# Patient Record
Sex: MALE | Race: WHITE | NOT HISPANIC OR LATINO | URBAN - METROPOLITAN AREA
[De-identification: names, ages, dates, MRNs, and addresses within clinical notes are randomized per-mention and may not be internally consistent; named-entity substitution may affect disease eponyms.]

---

## 2017-07-22 ENCOUNTER — OUTPATIENT (OUTPATIENT)
Dept: OUTPATIENT SERVICES | Facility: HOSPITAL | Age: 52
LOS: 1 days | Discharge: HOME | End: 2017-07-22

## 2017-07-22 DIAGNOSIS — D35.00 BENIGN NEOPLASM OF UNSPECIFIED ADRENAL GLAND: ICD-10-CM

## 2017-07-22 DIAGNOSIS — I10 ESSENTIAL (PRIMARY) HYPERTENSION: ICD-10-CM

## 2018-02-08 ENCOUNTER — EMERGENCY (EMERGENCY)
Facility: HOSPITAL | Age: 53
LOS: 0 days | Discharge: LEFT BEFORE DISCHARGE | End: 2018-02-08

## 2018-02-08 VITALS
TEMPERATURE: 97 F | HEART RATE: 74 BPM | OXYGEN SATURATION: 100 % | SYSTOLIC BLOOD PRESSURE: 151 MMHG | RESPIRATION RATE: 18 BRPM | DIASTOLIC BLOOD PRESSURE: 92 MMHG

## 2018-02-08 DIAGNOSIS — Z88.0 ALLERGY STATUS TO PENICILLIN: ICD-10-CM

## 2018-02-08 DIAGNOSIS — M54.6 PAIN IN THORACIC SPINE: ICD-10-CM

## 2018-02-08 DIAGNOSIS — R07.89 OTHER CHEST PAIN: ICD-10-CM

## 2018-02-08 LAB
ALBUMIN SERPL ELPH-MCNC: 4 G/DL — SIGNIFICANT CHANGE UP (ref 3–5.5)
ALP SERPL-CCNC: 47 U/L — SIGNIFICANT CHANGE UP (ref 30–115)
ALT FLD-CCNC: 31 U/L — SIGNIFICANT CHANGE UP (ref 0–41)
ANION GAP SERPL CALC-SCNC: 7 MMOL/L — SIGNIFICANT CHANGE UP (ref 7–14)
APTT BLD: 28.7 SEC — SIGNIFICANT CHANGE UP (ref 27–39.2)
AST SERPL-CCNC: 30 U/L — SIGNIFICANT CHANGE UP (ref 0–41)
BILIRUB SERPL-MCNC: 0.6 MG/DL — SIGNIFICANT CHANGE UP (ref 0.2–1.2)
BUN SERPL-MCNC: 12 MG/DL — SIGNIFICANT CHANGE UP (ref 10–20)
CALCIUM SERPL-MCNC: 10.4 MG/DL — HIGH (ref 8.5–10.1)
CHLORIDE SERPL-SCNC: 107 MMOL/L — SIGNIFICANT CHANGE UP (ref 98–110)
CK MB CFR SERPL CALC: 3.3 NG/ML — SIGNIFICANT CHANGE UP (ref 0.6–6.3)
CK SERPL-CCNC: 105 U/L — SIGNIFICANT CHANGE UP (ref 0–225)
CO2 SERPL-SCNC: 27 MMOL/L — SIGNIFICANT CHANGE UP (ref 17–32)
CREAT SERPL-MCNC: 1 MG/DL — SIGNIFICANT CHANGE UP (ref 0.7–1.5)
D DIMER BLD IA.RAPID-MCNC: 270 NG/ML DDU — HIGH (ref 0–230)
GLUCOSE SERPL-MCNC: 131 MG/DL — HIGH (ref 70–110)
HCT VFR BLD CALC: 48.4 % — SIGNIFICANT CHANGE UP (ref 42–52)
HGB BLD-MCNC: 16.5 G/DL — SIGNIFICANT CHANGE UP (ref 14–18)
INR BLD: 1.02 RATIO — SIGNIFICANT CHANGE UP (ref 0.65–1.3)
MCHC RBC-ENTMCNC: 29.9 PG — SIGNIFICANT CHANGE UP (ref 27–31)
MCHC RBC-ENTMCNC: 34.1 G/DL — SIGNIFICANT CHANGE UP (ref 32–37)
MCV RBC AUTO: 87.8 FL — SIGNIFICANT CHANGE UP (ref 80–94)
NRBC # BLD: 0 /100 WBCS — SIGNIFICANT CHANGE UP (ref 0–0)
PLATELET # BLD AUTO: 294 K/UL — SIGNIFICANT CHANGE UP (ref 130–400)
POTASSIUM SERPL-MCNC: 4.3 MMOL/L — SIGNIFICANT CHANGE UP (ref 3.5–5)
POTASSIUM SERPL-SCNC: 4.3 MMOL/L — SIGNIFICANT CHANGE UP (ref 3.5–5)
PROT SERPL-MCNC: 7.3 G/DL — SIGNIFICANT CHANGE UP (ref 6–8)
PROTHROM AB SERPL-ACNC: 11 SEC — SIGNIFICANT CHANGE UP (ref 9.95–12.87)
RBC # BLD: 5.51 M/UL — SIGNIFICANT CHANGE UP (ref 4.7–6.1)
RBC # FLD: 13.2 % — SIGNIFICANT CHANGE UP (ref 11.5–14.5)
SODIUM SERPL-SCNC: 141 MMOL/L — SIGNIFICANT CHANGE UP (ref 135–146)
TROPONIN I SERPL-MCNC: <0.02 NG/ML — SIGNIFICANT CHANGE UP (ref 0–0.05)
WBC # BLD: 10.05 K/UL — SIGNIFICANT CHANGE UP (ref 4.8–10.8)
WBC # FLD AUTO: 10.05 K/UL — SIGNIFICANT CHANGE UP (ref 4.8–10.8)

## 2018-02-08 RX ORDER — IBUPROFEN 200 MG
600 TABLET ORAL ONCE
Qty: 0 | Refills: 0 | Status: COMPLETED | OUTPATIENT
Start: 2018-02-08 | End: 2018-02-08

## 2018-02-08 RX ADMIN — Medication 600 MILLIGRAM(S): at 14:34

## 2018-02-08 NOTE — ED PROVIDER NOTE - OBJECTIVE STATEMENT
51 yo male with no significant pmh presents to the ED c/o upper back pain with radiation to chest x 2-3 days. Associated with sob. No trauma or injury to back. worse with inspiration. Not associated with exertion or movement. Denies fever, chills, cough, URI sxs, abdominal pain, N/V, UE/LE weakness or paresthesias. Nonsmoker. + family hx CAD (father with MI at 59) 51 yo male with no significant pmh presents to the ED c/o upper back pain with radiation to chest x 2-3 days. Associated with sob. No trauma or injury to back. Back/chest pain is constant, worse with inspiration. Not associated with exertion or movement. Denies fever, chills, cough, URI sxs, abdominal pain, N/V, UE/LE weakness or paresthesias. Nonsmoker. + family hx CAD (father with MI at 59)

## 2018-02-08 NOTE — ED PROVIDER NOTE - ATTENDING CONTRIBUTION TO CARE
Seen with pa agree with above, lungs- clear, abdomen- soft no tenderness to any region, neuro- non focal, s1s2 no gallops or murmurs, Ekg- no ischemic changes, enzymes negative, d- dimer slightly elevated  will get CT- chest to rule out PE

## 2018-02-08 NOTE — ED PROVIDER NOTE - NS ED ROS FT
Constitutional: no fever, chills or generalized weakness  Cardiovascular: back pain with radiation to chest, + SOB. no syncope , no palpitations, no peripheral edema  Respiratory: + SOB. no cough. no h/o asthma or COPD. nonsmoker  Gastrointestinal: no nausea, vomiting. no abdominal pain  Musculoskeletal: + upper back pain with radiation to chest. no trauma to back or chest  Integumentary: no rash or skin changes. no edema  Neurological: no UE/LE weakness or paresthesias

## 2018-02-08 NOTE — ED PROVIDER NOTE - PROGRESS NOTE DETAILS
The patient wishes to leave against medical advice.  I have discussed the risks, benefits and alternatives (including the possibility of worsening of disease, pain, permanent disability, and/or death) with the patient.  The patient voices understanding of these risks, benefits, and alternatives and still wishes to sign out against medical advice.  The patient is awake, alert, oriented  x 3 and has demonstrated capacity to refuse/direct care.  I have advised the patient that they can and should return immediately should they develop any worse/different/additional symptoms, or if they change their mind and want to continue their care. Patient will follow up with cardiologist Dr. Dunne.

## 2018-02-08 NOTE — ED PROVIDER NOTE - CONDUCTED A DETAILED DISCUSSION WITH PATIENT AND/OR GUARDIAN REGARDING, MDM
return to ED if symptoms worsen, persist or questions arise/lab results/radiology results lab results/radiology results/need for outpatient follow-up/return to ED if symptoms worsen, persist or questions arise

## 2018-03-06 ENCOUNTER — TRANSCRIPTION ENCOUNTER (OUTPATIENT)
Age: 53
End: 2018-03-06

## 2018-03-29 ENCOUNTER — OUTPATIENT (OUTPATIENT)
Dept: OUTPATIENT SERVICES | Facility: HOSPITAL | Age: 53
LOS: 1 days | Discharge: HOME | End: 2018-03-29

## 2018-03-29 DIAGNOSIS — E78.5 HYPERLIPIDEMIA, UNSPECIFIED: ICD-10-CM

## 2018-03-29 DIAGNOSIS — I10 ESSENTIAL (PRIMARY) HYPERTENSION: ICD-10-CM

## 2019-02-28 ENCOUNTER — OUTPATIENT (OUTPATIENT)
Dept: OUTPATIENT SERVICES | Facility: HOSPITAL | Age: 54
LOS: 1 days | Discharge: HOME | End: 2019-02-28

## 2019-02-28 DIAGNOSIS — Z01.82 ENCOUNTER FOR ALLERGY TESTING: ICD-10-CM

## 2019-02-28 DIAGNOSIS — R23.2 FLUSHING: ICD-10-CM

## 2019-05-17 ENCOUNTER — OUTPATIENT (OUTPATIENT)
Dept: OUTPATIENT SERVICES | Facility: HOSPITAL | Age: 54
LOS: 1 days | Discharge: HOME | End: 2019-05-17
Payer: MEDICARE

## 2019-05-17 DIAGNOSIS — R10.9 UNSPECIFIED ABDOMINAL PAIN: ICD-10-CM

## 2019-05-17 DIAGNOSIS — N50.812 LEFT TESTICULAR PAIN: ICD-10-CM

## 2019-05-17 PROCEDURE — 76700 US EXAM ABDOM COMPLETE: CPT | Mod: 26

## 2019-05-17 PROCEDURE — 76870 US EXAM SCROTUM: CPT | Mod: 26

## 2020-07-27 ENCOUNTER — OUTPATIENT (OUTPATIENT)
Dept: OUTPATIENT SERVICES | Facility: HOSPITAL | Age: 55
LOS: 1 days | Discharge: HOME | End: 2020-07-27
Payer: MEDICARE

## 2020-07-27 DIAGNOSIS — M54.2 CERVICALGIA: ICD-10-CM

## 2020-07-27 PROCEDURE — 72050 X-RAY EXAM NECK SPINE 4/5VWS: CPT | Mod: 26

## 2020-08-25 ENCOUNTER — OUTPATIENT (OUTPATIENT)
Dept: OUTPATIENT SERVICES | Facility: HOSPITAL | Age: 55
LOS: 1 days | Discharge: HOME | End: 2020-08-25
Payer: MEDICARE

## 2020-08-25 DIAGNOSIS — M62.838 OTHER MUSCLE SPASM: ICD-10-CM

## 2020-08-25 DIAGNOSIS — M79.644 PAIN IN RIGHT FINGER(S): ICD-10-CM

## 2020-08-25 PROCEDURE — 72141 MRI NECK SPINE W/O DYE: CPT | Mod: 26

## 2020-09-03 ENCOUNTER — APPOINTMENT (OUTPATIENT)
Dept: VASCULAR SURGERY | Facility: CLINIC | Age: 55
End: 2020-09-03
Payer: MEDICARE

## 2020-09-03 PROCEDURE — 93971 EXTREMITY STUDY: CPT

## 2020-09-06 ENCOUNTER — OUTPATIENT (OUTPATIENT)
Dept: OUTPATIENT SERVICES | Facility: HOSPITAL | Age: 55
LOS: 1 days | Discharge: HOME | End: 2020-09-06
Payer: MEDICARE

## 2020-09-06 DIAGNOSIS — M25.571 PAIN IN RIGHT ANKLE AND JOINTS OF RIGHT FOOT: ICD-10-CM

## 2020-09-06 PROCEDURE — 73721 MRI JNT OF LWR EXTRE W/O DYE: CPT | Mod: 26,RT

## 2020-09-17 ENCOUNTER — APPOINTMENT (OUTPATIENT)
Dept: PODIATRY | Facility: CLINIC | Age: 55
End: 2020-09-17
Payer: MEDICARE

## 2020-09-17 VITALS
HEART RATE: 65 BPM | TEMPERATURE: 97.7 F | SYSTOLIC BLOOD PRESSURE: 124 MMHG | WEIGHT: 215 LBS | HEIGHT: 72 IN | DIASTOLIC BLOOD PRESSURE: 72 MMHG | BODY MASS INDEX: 29.12 KG/M2

## 2020-09-17 DIAGNOSIS — M89.9 DISORDER OF BONE, UNSPECIFIED: ICD-10-CM

## 2020-09-17 DIAGNOSIS — Z78.9 OTHER SPECIFIED HEALTH STATUS: ICD-10-CM

## 2020-09-17 DIAGNOSIS — M94.9 DISORDER OF BONE, UNSPECIFIED: ICD-10-CM

## 2020-09-17 DIAGNOSIS — M25.571 PAIN IN RIGHT ANKLE AND JOINTS OF RIGHT FOOT: ICD-10-CM

## 2020-09-17 DIAGNOSIS — Z72.89 OTHER PROBLEMS RELATED TO LIFESTYLE: ICD-10-CM

## 2020-09-17 DIAGNOSIS — Z82.49 FAMILY HISTORY OF ISCHEMIC HEART DISEASE AND OTHER DISEASES OF THE CIRCULATORY SYSTEM: ICD-10-CM

## 2020-09-17 PROCEDURE — 99203 OFFICE O/P NEW LOW 30 MIN: CPT

## 2020-09-30 PROBLEM — Z78.9 CAFFEINE USE: Status: ACTIVE | Noted: 2020-09-30

## 2020-09-30 PROBLEM — Z78.9 NON-SMOKER: Status: ACTIVE | Noted: 2020-09-30

## 2020-09-30 PROBLEM — Z82.49 FAMILY HISTORY OF MYOCARDIAL INFARCTION: Status: ACTIVE | Noted: 2020-09-30

## 2020-09-30 PROBLEM — Z78.9 KNOWN HEALTH PROBLEMS: NONE: Status: RESOLVED | Noted: 2020-09-30 | Resolved: 2020-09-30

## 2020-09-30 PROBLEM — M25.571 ACUTE RIGHT ANKLE PAIN: Status: ACTIVE | Noted: 2020-09-30

## 2020-09-30 PROBLEM — Z72.89 ALCOHOL USE: Status: ACTIVE | Noted: 2020-09-30

## 2020-09-30 PROBLEM — M89.9 OSTEOCHONDRAL TALAR DOME LESION: Status: ACTIVE | Noted: 2020-09-30

## 2020-09-30 PROBLEM — Z78.9 DOES NOT USE ILLICIT DRUGS: Status: ACTIVE | Noted: 2020-09-30

## 2020-09-30 NOTE — ASSESSMENT
[FreeTextEntry1] : \par \par Patient examined, history and chart reviewed\par Discussed Radiologist findings with Patient in Detail \par Patient made aware of all conditions and is in agreement with follow up treatment plan \par \par \par \par Impression: \par \par 1. Chronic medial osteochondral lesion of talus with complete fluid plane between the fragment and parent bone suggestive of instability. \par 2. Flexor hallucis longus myotendinous tear just above ankle with tenosynovitis \par 3. Degenerated os trigonum, PTFL sprain and synovitis. Findings suggest posterior ankle impingement \par 4. ATFL and calcaneofibular ligament sprains \par \par \par Discussed that condition\par Rx cam boot\par will follow up in 6-8 weeks for follow up evaluation

## 2020-09-30 NOTE — HISTORY OF PRESENT ILLNESS
[Sneakers] : adal [FreeTextEntry1] : CONSUELO QUINTANILLA   presents on 09/17/2020  for a foot examination, patient was referred by PCP. Patient complains of  pain of right ankle.  Patient denies NVFC and denies SOB.\par \par Patient was sent for Xray and MRI of Right ankle by PCP \par \par Patient denies acute injury that he can remember \par \par \par

## 2020-09-30 NOTE — VITALS
[Aching] : aching [de-identified] : right ankle  [FreeTextEntry3] : RIght foot medial ankle  [FreeTextEntry1] : rest  [FreeTextEntry2] : Ambulating

## 2020-09-30 NOTE — PHYSICAL EXAM
[General Appearance - Alert] : alert [General Appearance - In No Acute Distress] : in no acute distress [Ankle Swelling (On Exam)] : present [Ankle Swelling On The Right] : mild [Varicose Veins Of Lower Extremities] : bilaterally [Delayed in the Right Toes] : capillary refills normal in right toes [Delayed in the Left Toes] : capillary refills normal in the left toes [2+] : left foot dorsalis pedis 2+ [Skin Color & Pigmentation] : normal skin color and pigmentation [Skin Turgor] : normal skin turgor [] : no rash [Skin Lesions] : no skin lesions [Foot Ulcer] : no foot ulcer [Skin Induration] : no skin induration [Sensation] : the sensory exam was normal to light touch and pinprick [No Focal Deficits] : no focal deficits [Deep Tendon Reflexes (DTR)] : deep tendon reflexes were 2+ and symmetric [Motor Exam] : the motor exam was normal [Oriented To Time, Place, And Person] : oriented to person, place, and time [Impaired Insight] : insight and judgment were intact [Affect] : the affect was normal

## 2020-11-17 ENCOUNTER — APPOINTMENT (OUTPATIENT)
Dept: PODIATRY | Facility: CLINIC | Age: 55
End: 2020-11-17

## 2021-01-26 ENCOUNTER — OUTPATIENT (OUTPATIENT)
Dept: OUTPATIENT SERVICES | Facility: HOSPITAL | Age: 56
LOS: 1 days | Discharge: HOME | End: 2021-01-26
Payer: MEDICARE

## 2021-01-26 DIAGNOSIS — M25.562 PAIN IN LEFT KNEE: ICD-10-CM

## 2021-01-26 PROCEDURE — 73562 X-RAY EXAM OF KNEE 3: CPT | Mod: 26,LT

## 2021-04-09 ENCOUNTER — OUTPATIENT (OUTPATIENT)
Dept: OUTPATIENT SERVICES | Facility: HOSPITAL | Age: 56
LOS: 1 days | Discharge: HOME | End: 2021-04-09
Payer: MEDICARE

## 2021-04-09 DIAGNOSIS — N20.0 CALCULUS OF KIDNEY: ICD-10-CM

## 2021-04-09 PROCEDURE — 76775 US EXAM ABDO BACK WALL LIM: CPT | Mod: 26

## 2021-05-07 ENCOUNTER — OUTPATIENT (OUTPATIENT)
Dept: OUTPATIENT SERVICES | Facility: HOSPITAL | Age: 56
LOS: 1 days | Discharge: HOME | End: 2021-05-07
Payer: MEDICARE

## 2021-05-07 DIAGNOSIS — N20.0 CALCULUS OF KIDNEY: ICD-10-CM

## 2021-05-07 PROCEDURE — 74177 CT ABD & PELVIS W/CONTRAST: CPT | Mod: 26,MH

## 2021-05-11 ENCOUNTER — OUTPATIENT (OUTPATIENT)
Dept: OUTPATIENT SERVICES | Facility: HOSPITAL | Age: 56
LOS: 1 days | Discharge: HOME | End: 2021-05-11
Payer: MEDICARE

## 2021-05-11 DIAGNOSIS — M54.5 LOW BACK PAIN: ICD-10-CM

## 2021-05-11 PROCEDURE — 72148 MRI LUMBAR SPINE W/O DYE: CPT | Mod: 26,MH

## 2022-03-10 ENCOUNTER — APPOINTMENT (OUTPATIENT)
Dept: CARDIOLOGY | Facility: CLINIC | Age: 57
End: 2022-03-10
Payer: MEDICARE

## 2022-03-10 VITALS
WEIGHT: 222.9 LBS | TEMPERATURE: 97.9 F | HEIGHT: 72 IN | SYSTOLIC BLOOD PRESSURE: 126 MMHG | DIASTOLIC BLOOD PRESSURE: 74 MMHG | HEART RATE: 53 BPM | BODY MASS INDEX: 30.19 KG/M2 | OXYGEN SATURATION: 96 %

## 2022-03-10 PROCEDURE — 93000 ELECTROCARDIOGRAM COMPLETE: CPT

## 2022-03-10 PROCEDURE — 99203 OFFICE O/P NEW LOW 30 MIN: CPT

## 2022-03-10 NOTE — HISTORY OF PRESENT ILLNESS
[FreeTextEntry1] : 56M with pre-DM, family hxo CAD, COVID infection 2021 (no hospitalization) here for evaluation of dyspnea on exertion and chest discomfort. \par \par Used to be able to workout on stair machine for 45-60 minutes, now gasping for air after 15 minutes. Also has retrosternal chest discomfort that comes and go, lasting for 5-10 minutes at a time and resolving spontaneously. Can occur at rest, with exercise, standing, etc. Occurs up to 3x/day. No history of CAD. He has had a normal stress test in the past. \par \par Diet:\par Grilled chicken, salad, rice, fish\par Red meat 1x per week\par Not drinking much water\par Veg, Bananas, apples, No cold cuts\par \par Retired \par , 2 adult sons living out of state\par Non smoker\par \par Fam Hx\par Father: MI,  at 60 yo 3 mos after CABG\par Mother: cor stents in 70s, HTN\par Sister: SLE \par \par COVID vaccine x2\par COVID infection 2021

## 2022-03-10 NOTE — PHYSICAL EXAM
[Well Developed] : well developed [Well Nourished] : well nourished [No Acute Distress] : no acute distress [No Carotid Bruit] : no carotid bruit [Normal S1, S2] : normal S1, S2 [No Murmur] : no murmur [No Rub] : no rub [No Gallop] : no gallop [Clear Lung Fields] : clear lung fields [Good Air Entry] : good air entry [No Respiratory Distress] : no respiratory distress  [Soft] : abdomen soft [Moves all extremities] : moves all extremities [No Focal Deficits] : no focal deficits [No ulcers] : no ulcers [No edema] : no edema [Present] : present bilaterally

## 2022-03-10 NOTE — ASSESSMENT
[FreeTextEntry1] : Assessment:\par #Dyspnea on exertion\par #Chest discomfort\par - Has risk factors for CAD, need to rule out ischemia\par #COVID infection 12/2021\par #Pre-DM\par - 1/2021 HgA1c 5.9\par #Dyslipidemia\par - 1/2021 , , HDL 64,  not on any cholesterol lowering medications\par #FMH of CAD\par #BMI 30\par \par Plan:\par - Check tte and exercise nuclear stress test\par - CMP, A1c, TSH, lipid profile\par - Agree with pulmonary evaluation for dyspnea\par - RTC in 3 months\par

## 2022-03-10 NOTE — REVIEW OF SYSTEMS
[SOB] : shortness of breath [Dyspnea on exertion] : dyspnea during exertion [Joint Pain] : joint pain [Fever] : no fever [Headache] : no headache [Chills] : no chills [Chest Discomfort] : no chest discomfort [Lower Ext Edema] : no extremity edema [Leg Claudication] : no intermittent leg claudication [Palpitations] : no palpitations [Orthopnea] : no orthopnea [PND] : no PND [Syncope] : no syncope [Cough] : no cough [Abdominal Pain] : no abdominal pain [Rash] : no rash [Dizziness] : no dizziness [Confusion] : no confusion was observed

## 2022-03-11 ENCOUNTER — APPOINTMENT (OUTPATIENT)
Dept: CARDIOLOGY | Facility: CLINIC | Age: 57
End: 2022-03-11
Payer: MEDICARE

## 2022-03-11 PROCEDURE — 93306 TTE W/DOPPLER COMPLETE: CPT

## 2022-03-17 ENCOUNTER — OUTPATIENT (OUTPATIENT)
Dept: OUTPATIENT SERVICES | Facility: HOSPITAL | Age: 57
LOS: 1 days | Discharge: HOME | End: 2022-03-17
Payer: MEDICARE

## 2022-03-17 ENCOUNTER — RESULT REVIEW (OUTPATIENT)
Age: 57
End: 2022-03-17

## 2022-03-17 DIAGNOSIS — R06.02 SHORTNESS OF BREATH: ICD-10-CM

## 2022-03-17 PROCEDURE — 78452 HT MUSCLE IMAGE SPECT MULT: CPT | Mod: 26,MH

## 2022-03-23 ENCOUNTER — NON-APPOINTMENT (OUTPATIENT)
Age: 57
End: 2022-03-23

## 2022-03-23 LAB
ALBUMIN SERPL ELPH-MCNC: 4.5 G/DL
ALP BLD-CCNC: 53 U/L
ALT SERPL-CCNC: 25 U/L
ANION GAP SERPL CALC-SCNC: 11 MMOL/L
AST SERPL-CCNC: 23 U/L
BILIRUB SERPL-MCNC: 0.5 MG/DL
BUN SERPL-MCNC: 12 MG/DL
CALCIUM SERPL-MCNC: 10.1 MG/DL
CHLORIDE SERPL-SCNC: 105 MMOL/L
CHOLEST SERPL-MCNC: 221 MG/DL
CO2 SERPL-SCNC: 26 MMOL/L
CREAT SERPL-MCNC: 1.2 MG/DL
EGFR: 70 ML/MIN/1.73M2
ESTIMATED AVERAGE GLUCOSE: 120 MG/DL
GLUCOSE SERPL-MCNC: 85 MG/DL
HBA1C MFR BLD HPLC: 5.8 %
HDLC SERPL-MCNC: 71 MG/DL
LDLC SERPL CALC-MCNC: 141 MG/DL
NONHDLC SERPL-MCNC: 150 MG/DL
POTASSIUM SERPL-SCNC: 5.1 MMOL/L
PROT SERPL-MCNC: 7.6 G/DL
SODIUM SERPL-SCNC: 142 MMOL/L
TRIGL SERPL-MCNC: 67 MG/DL
TSH SERPL-ACNC: 1.27 UIU/ML

## 2022-03-29 ENCOUNTER — NON-APPOINTMENT (OUTPATIENT)
Age: 57
End: 2022-03-29

## 2022-03-31 NOTE — ED PROVIDER NOTE - PHYSICAL EXAMINATION
[de-identified] : 79 yr old male c/o clots of sneezing this week and it hurts his back when he sneezes (pinched nerves in his back).\par no discolored mucous \par +left epistaxis with nose blowing\par using NS spray\par -hx allergy GENERAL:  well appearing, non-toxic male in no acute distress  SKIN: skin warm, pink and dry. MMM. no rash to chest or back   PULM: CTAB. Normal respiratory effort. No respiratory distress. No wheezes, stridor, rales or rhonchi. No retractions  CV: RRR, no M/R/G.   ABD: Soft, non-tender, non-distended. No rebound or guarding.   MSK: FROM of all extremities. + mild TTP to upper back and anterior chest wall. Distal pulses intact.  NEURO: A+Ox3, no sensory/motor deficits, CN II-XII intact.

## 2022-04-27 DIAGNOSIS — R07.89 OTHER CHEST PAIN: ICD-10-CM

## 2022-06-30 ENCOUNTER — APPOINTMENT (OUTPATIENT)
Dept: CARDIOLOGY | Facility: CLINIC | Age: 57
End: 2022-06-30

## 2022-07-06 ENCOUNTER — HOSPITAL ENCOUNTER (EMERGENCY)
Facility: HOSPITAL | Age: 57
Discharge: HOME | End: 2022-07-06
Attending: EMERGENCY MEDICINE
Payer: MEDICARE

## 2022-07-06 ENCOUNTER — APPOINTMENT (EMERGENCY)
Dept: RADIOLOGY | Facility: HOSPITAL | Age: 57
End: 2022-07-06
Payer: MEDICARE

## 2022-07-06 VITALS
DIASTOLIC BLOOD PRESSURE: 81 MMHG | TEMPERATURE: 97.6 F | WEIGHT: 219 LBS | SYSTOLIC BLOOD PRESSURE: 146 MMHG | HEART RATE: 66 BPM | RESPIRATION RATE: 16 BRPM

## 2022-07-06 DIAGNOSIS — S69.91XA HAND INJURY, RIGHT, INITIAL ENCOUNTER: Primary | ICD-10-CM

## 2022-07-06 PROCEDURE — 99284 EMERGENCY DEPT VISIT MOD MDM: CPT | Performed by: EMERGENCY MEDICINE

## 2022-07-06 PROCEDURE — 73130 X-RAY EXAM OF HAND: CPT

## 2022-07-06 PROCEDURE — 99283 EMERGENCY DEPT VISIT LOW MDM: CPT

## 2022-07-06 NOTE — DISCHARGE INSTRUCTIONS
Take motrin/tylenol as needed, wear brace for comfort and follow up with orthopedics if not improving

## 2022-07-06 NOTE — ED PROVIDER NOTES
History  Chief Complaint   Patient presents with    Hand Pain     Patient reports twisting open a bottle today when they heard a "crack" to their right hand below right thumb  Patient reports pain but displays ability to move x5 fingers  2+ radial pulses, cap refill less than 2 seconds  HPI  59 yo RHD M presents with hand injury  He was opening a bottle today when he heard a crack and now has pain in hand  He is able to move hand  No weakness or numbness  Pain is moderate, constant, worse with movement  None       History reviewed  No pertinent past medical history  History reviewed  No pertinent surgical history  History reviewed  No pertinent family history  I have reviewed and agree with the history as documented  E-Cigarette/Vaping    E-Cigarette Use Never User      E-Cigarette/Vaping Substances     Social History     Tobacco Use    Smoking status: Never Smoker    Smokeless tobacco: Never Used   Vaping Use    Vaping Use: Never used   Substance Use Topics    Drug use: Never       Review of Systems   Constitutional: Negative for chills and fever  HENT: Negative for dental problem and ear pain  Eyes: Negative for pain and redness  Respiratory: Negative for cough and shortness of breath  Cardiovascular: Negative for chest pain and palpitations  Gastrointestinal: Negative for abdominal pain and nausea  Endocrine: Negative for polydipsia and polyphagia  Genitourinary: Negative for dysuria and frequency  Musculoskeletal: Positive for arthralgias  Negative for joint swelling  Skin: Negative for color change and rash  Neurological: Negative for dizziness and headaches  Psychiatric/Behavioral: Negative for behavioral problems and confusion  All other systems reviewed and are negative  Physical Exam  Physical Exam  Vitals and nursing note reviewed  Constitutional:       General: He is not in acute distress  HENT:      Head: Normocephalic and atraumatic        Right Ear: External ear normal       Left Ear: External ear normal       Nose: Nose normal    Eyes:      General: No scleral icterus  Cardiovascular:      Rate and Rhythm: Normal rate  Pulmonary:      Effort: Pulmonary effort is normal  No respiratory distress  Abdominal:      General: There is no distension  Musculoskeletal:         General: No deformity  Normal range of motion  Comments: R hand TTP dorsal aspect, normal ROM, radial pulse 2+   Skin:     Findings: No rash  Neurological:      General: No focal deficit present  Mental Status: He is alert  Gait: Gait normal    Psychiatric:         Mood and Affect: Mood normal          Vital Signs  ED Triage Vitals [07/06/22 1625]   Temperature Pulse Respirations Blood Pressure SpO2   97 6 °F (36 4 °C) 66 16 146/81 --      Temp Source Heart Rate Source Patient Position - Orthostatic VS BP Location FiO2 (%)   Oral Monitor Sitting Left arm --      Pain Score       --           Vitals:    07/06/22 1625   BP: 146/81   Pulse: 66   Patient Position - Orthostatic VS: Sitting         Visual Acuity      ED Medications  Medications - No data to display    Diagnostic Studies  Results Reviewed     None                 XR hand 3+ views RIGHT    (Results Pending)              Procedures  Procedures         ED Course                               SBIRT 22yo+    Flowsheet Row Most Recent Value   SBIRT (23 yo +)    In order to provide better care to our patients, we are screening all of our patients for alcohol and drug use  Would it be okay to ask you these screening questions? Yes Filed at: 07/06/2022 1710   Initial Alcohol Screen: US AUDIT-C     1  How often do you have a drink containing alcohol? 0 Filed at: 07/06/2022 1710   2  How many drinks containing alcohol do you have on a typical day you are drinking? 0 Filed at: 07/06/2022 1710   3a  Male UNDER 65: How often do you have five or more drinks on one occasion? 0 Filed at: 07/06/2022 1710   3b   FEMALE Any Age, or MALE 65+: How often do you have 4 or more drinks on one occassion? 0 Filed at: 07/06/2022 1710   Audit-C Score 0 Filed at: 07/06/2022 1710   AUNG: How many times in the past year have you    Used an illegal drug or used a prescription medication for non-medical reasons? Never Filed at: 07/06/2022 1710                    MDM  XR shows no acute fracture or dislocation per my read  Have placed velcro wrist splint, discussed follow up with orthopedics and supportive care  Disposition  Final diagnoses:   Hand injury, right, initial encounter     Time reflects when diagnosis was documented in both MDM as applicable and the Disposition within this note     Time User Action Codes Description Comment    7/6/2022  5:29 PM Rosalinda Habermann Add [[id number]] Hand injury, right, initial encounter       ED Disposition     ED Disposition   Discharge    Condition   Stable    Date/Time   Wed Jul 6, 2022  5:29 PM    Comment   Diane Lr discharge to home/self care  Follow-up Information     Follow up With Specialties Details Why Contact Info Additional [address] Orthopedic Surgery Call  for orthopedic follow up Ladarius Moncada 42 [phone] [address] [facility], [address], [place], [place], [phone]          Patient's Medications    No medications on file       No discharge procedures on file      PDMP Review     None          ED Provider  Electronically Signed by           Niurka Brooke MD  07/06/22 5402

## 2023-02-17 ENCOUNTER — NON-APPOINTMENT (OUTPATIENT)
Age: 58
End: 2023-02-17

## 2023-05-12 ENCOUNTER — EMERGENCY (EMERGENCY)
Facility: HOSPITAL | Age: 58
LOS: 0 days | Discharge: HOME | End: 2023-05-13
Attending: STUDENT IN AN ORGANIZED HEALTH CARE EDUCATION/TRAINING PROGRAM
Payer: MEDICARE

## 2023-05-12 VITALS
SYSTOLIC BLOOD PRESSURE: 135 MMHG | DIASTOLIC BLOOD PRESSURE: 79 MMHG | OXYGEN SATURATION: 97 % | HEIGHT: 72 IN | RESPIRATION RATE: 18 BRPM | WEIGHT: 218.04 LBS | HEART RATE: 69 BPM | TEMPERATURE: 99 F

## 2023-05-12 DIAGNOSIS — R10.31 RIGHT LOWER QUADRANT PAIN: ICD-10-CM

## 2023-05-12 DIAGNOSIS — Z88.0 ALLERGY STATUS TO PENICILLIN: ICD-10-CM

## 2023-05-12 DIAGNOSIS — R19.7 DIARRHEA, UNSPECIFIED: ICD-10-CM

## 2023-05-12 DIAGNOSIS — R63.0 ANOREXIA: ICD-10-CM

## 2023-05-12 LAB
ALBUMIN SERPL ELPH-MCNC: 4.1 G/DL — SIGNIFICANT CHANGE UP (ref 3.5–5.2)
ALP SERPL-CCNC: 50 U/L — SIGNIFICANT CHANGE UP (ref 30–115)
ALT FLD-CCNC: 38 U/L — SIGNIFICANT CHANGE UP (ref 0–41)
ANION GAP SERPL CALC-SCNC: 11 MMOL/L — SIGNIFICANT CHANGE UP (ref 7–14)
APPEARANCE UR: CLEAR — SIGNIFICANT CHANGE UP
AST SERPL-CCNC: 30 U/L — SIGNIFICANT CHANGE UP (ref 0–41)
BASOPHILS # BLD AUTO: 0.03 K/UL — SIGNIFICANT CHANGE UP (ref 0–0.2)
BASOPHILS NFR BLD AUTO: 0.4 % — SIGNIFICANT CHANGE UP (ref 0–1)
BILIRUB SERPL-MCNC: 0.4 MG/DL — SIGNIFICANT CHANGE UP (ref 0.2–1.2)
BILIRUB UR-MCNC: NEGATIVE — SIGNIFICANT CHANGE UP
BUN SERPL-MCNC: 11 MG/DL — SIGNIFICANT CHANGE UP (ref 10–20)
CALCIUM SERPL-MCNC: 9.3 MG/DL — SIGNIFICANT CHANGE UP (ref 8.4–10.5)
CHLORIDE SERPL-SCNC: 108 MMOL/L — SIGNIFICANT CHANGE UP (ref 98–110)
CO2 SERPL-SCNC: 26 MMOL/L — SIGNIFICANT CHANGE UP (ref 17–32)
COLOR SPEC: SIGNIFICANT CHANGE UP
CREAT SERPL-MCNC: 1 MG/DL — SIGNIFICANT CHANGE UP (ref 0.7–1.5)
DIFF PNL FLD: NEGATIVE — SIGNIFICANT CHANGE UP
EGFR: 87 ML/MIN/1.73M2 — SIGNIFICANT CHANGE UP
EOSINOPHIL # BLD AUTO: 0.34 K/UL — SIGNIFICANT CHANGE UP (ref 0–0.7)
EOSINOPHIL NFR BLD AUTO: 5 % — SIGNIFICANT CHANGE UP (ref 0–8)
GLUCOSE SERPL-MCNC: 88 MG/DL — SIGNIFICANT CHANGE UP (ref 70–99)
GLUCOSE UR QL: NEGATIVE — SIGNIFICANT CHANGE UP
HCT VFR BLD CALC: 46 % — SIGNIFICANT CHANGE UP (ref 42–52)
HGB BLD-MCNC: 15.3 G/DL — SIGNIFICANT CHANGE UP (ref 14–18)
IMM GRANULOCYTES NFR BLD AUTO: 0.3 % — SIGNIFICANT CHANGE UP (ref 0.1–0.3)
KETONES UR-MCNC: NEGATIVE — SIGNIFICANT CHANGE UP
LEUKOCYTE ESTERASE UR-ACNC: NEGATIVE — SIGNIFICANT CHANGE UP
LIDOCAIN IGE QN: 25 U/L — SIGNIFICANT CHANGE UP (ref 7–60)
LYMPHOCYTES # BLD AUTO: 2.56 K/UL — SIGNIFICANT CHANGE UP (ref 1.2–3.4)
LYMPHOCYTES # BLD AUTO: 37.5 % — SIGNIFICANT CHANGE UP (ref 20.5–51.1)
MCHC RBC-ENTMCNC: 30.3 PG — SIGNIFICANT CHANGE UP (ref 27–31)
MCHC RBC-ENTMCNC: 33.3 G/DL — SIGNIFICANT CHANGE UP (ref 32–37)
MCV RBC AUTO: 91.1 FL — SIGNIFICANT CHANGE UP (ref 80–94)
MONOCYTES # BLD AUTO: 0.75 K/UL — HIGH (ref 0.1–0.6)
MONOCYTES NFR BLD AUTO: 11 % — HIGH (ref 1.7–9.3)
NEUTROPHILS # BLD AUTO: 3.13 K/UL — SIGNIFICANT CHANGE UP (ref 1.4–6.5)
NEUTROPHILS NFR BLD AUTO: 45.8 % — SIGNIFICANT CHANGE UP (ref 42.2–75.2)
NITRITE UR-MCNC: NEGATIVE — SIGNIFICANT CHANGE UP
NRBC # BLD: 0 /100 WBCS — SIGNIFICANT CHANGE UP (ref 0–0)
PH UR: 6.5 — SIGNIFICANT CHANGE UP (ref 5–8)
PLATELET # BLD AUTO: 232 K/UL — SIGNIFICANT CHANGE UP (ref 130–400)
PMV BLD: 9.4 FL — SIGNIFICANT CHANGE UP (ref 7.4–10.4)
POTASSIUM SERPL-MCNC: 4.5 MMOL/L — SIGNIFICANT CHANGE UP (ref 3.5–5)
POTASSIUM SERPL-SCNC: 4.5 MMOL/L — SIGNIFICANT CHANGE UP (ref 3.5–5)
PROT SERPL-MCNC: 6.8 G/DL — SIGNIFICANT CHANGE UP (ref 6–8)
PROT UR-MCNC: ABNORMAL
RBC # BLD: 5.05 M/UL — SIGNIFICANT CHANGE UP (ref 4.7–6.1)
RBC # FLD: 13.1 % — SIGNIFICANT CHANGE UP (ref 11.5–14.5)
SODIUM SERPL-SCNC: 145 MMOL/L — SIGNIFICANT CHANGE UP (ref 135–146)
SP GR SPEC: >1.05 (ref 1.01–1.03)
UROBILINOGEN FLD QL: SIGNIFICANT CHANGE UP
WBC # BLD: 6.83 K/UL — SIGNIFICANT CHANGE UP (ref 4.8–10.8)
WBC # FLD AUTO: 6.83 K/UL — SIGNIFICANT CHANGE UP (ref 4.8–10.8)

## 2023-05-12 PROCEDURE — 96374 THER/PROPH/DIAG INJ IV PUSH: CPT

## 2023-05-12 PROCEDURE — 85025 COMPLETE CBC W/AUTO DIFF WBC: CPT

## 2023-05-12 PROCEDURE — 36415 COLL VENOUS BLD VENIPUNCTURE: CPT

## 2023-05-12 PROCEDURE — 74177 CT ABD & PELVIS W/CONTRAST: CPT | Mod: MA

## 2023-05-12 PROCEDURE — 87086 URINE CULTURE/COLONY COUNT: CPT

## 2023-05-12 PROCEDURE — 80053 COMPREHEN METABOLIC PANEL: CPT

## 2023-05-12 PROCEDURE — 76705 ECHO EXAM OF ABDOMEN: CPT

## 2023-05-12 PROCEDURE — 76705 ECHO EXAM OF ABDOMEN: CPT | Mod: 26

## 2023-05-12 PROCEDURE — 81001 URINALYSIS AUTO W/SCOPE: CPT

## 2023-05-12 PROCEDURE — 99284 EMERGENCY DEPT VISIT MOD MDM: CPT | Mod: 25

## 2023-05-12 PROCEDURE — 99284 EMERGENCY DEPT VISIT MOD MDM: CPT | Mod: FS

## 2023-05-12 PROCEDURE — 74177 CT ABD & PELVIS W/CONTRAST: CPT | Mod: 26,MA

## 2023-05-12 PROCEDURE — 83690 ASSAY OF LIPASE: CPT

## 2023-05-12 RX ORDER — KETOROLAC TROMETHAMINE 30 MG/ML
15 SYRINGE (ML) INJECTION ONCE
Refills: 0 | Status: DISCONTINUED | OUTPATIENT
Start: 2023-05-12 | End: 2023-05-12

## 2023-05-12 RX ORDER — METHOCARBAMOL 500 MG/1
1500 TABLET, FILM COATED ORAL ONCE
Refills: 0 | Status: COMPLETED | OUTPATIENT
Start: 2023-05-12 | End: 2023-05-12

## 2023-05-12 RX ADMIN — Medication 15 MILLIGRAM(S): at 22:56

## 2023-05-12 RX ADMIN — METHOCARBAMOL 1500 MILLIGRAM(S): 500 TABLET, FILM COATED ORAL at 22:56

## 2023-05-12 NOTE — ED ADULT NURSE NOTE - OBJECTIVE STATEMENT
Bad severe pain here, distended, no appetite, it hurts when I drink and eat. Its been at least 4 weeks, - patient   Wife reports patient got a colonoscopy and endoscopy today, had a CT scan at [facility] recently, no blood work done

## 2023-05-12 NOTE — ED PROVIDER NOTE - CLINICAL SUMMARY MEDICAL DECISION MAKING FREE TEXT BOX
pt was a signout from Dr. Webb. 57 yr old m that presents with abd pain. Labs  were ordered and reviewed.  Imaging was ordered and reviewed by me.  Appropriate medications for patient's presenting complaints were ordered and effects were reassessed.  Patient's records (prior hospital, ED visit, and/or nursing home notes if available) were reviewed.  Additional history was obtained from EMS, family, and/or PCP (where available).  Escalation to admission/observation was considered.  However patient feels much better and is comfortable with discharge.  Appropriate follow-up was arranged.     I have discussed the discharge plan with the patient. The patient agrees with the plan, as discussed.  The patient understands Emergency Department diagnosis is a preliminary diagnosis often based on limited information and that the patient must adhere to the follow-up plan as discussed.  The patient understands that if the symptoms worsen or if prescribed medications do not have the desired/planned effect that the patient may return to the Emergency Department at any time for further evaluation and treatment.

## 2023-05-12 NOTE — ED PROVIDER NOTE - PATIENT PORTAL LINK FT
You can access the FollowMyHealth Patient Portal offered by [facility] by registering at the following website: [email or web address]. By joining [employer] [facility]’s FollowMyHealth portal, you will also be able to view your health information using other applications (apps) compatible with our system.

## 2023-05-12 NOTE — ED ADULT NURSE NOTE - CHIEF COMPLAINT QUOTE
Bad severe pain here, distended, no appetite, it hurts when I drink and eat. Its been at least 4 weeks, - patient   Wife reports patient got a colonoscopy and endoscopy today, had a CT scan at [facility] recently, no blood work done

## 2023-05-12 NOTE — ED PROVIDER NOTE - ATTENDING APP SHARED VISIT CONTRIBUTION OF CARE
Patient no past medical hx presents with right lower quadrant pain for 1 month CT oral p.o. IV contrast negative performed outpatient.  Patient had a colonoscopy today with worsening pain for the last week intermittent comes in waves of note patient has been having decreased appetite intermittent diarrhea no blood.  Well appearing, NAD, non toxic. NCAT PERRLA EOMI neck supple non tender normal wob cta bl rrr abdomen s right lower quadrant tenderness along the rectus abdominis nd no rebound no guarding WWPx4 neuro non focal.  Rule out complication from GI procedure repeat CAT scan ultrasound labs urine

## 2023-05-12 NOTE — ED PROVIDER NOTE - PHYSICAL EXAMINATION
CONSTITUTIONAL: Well-appearing; well-nourished; in no apparent distress.   NECK: Supple; non-tender; no cervical lymphadenopathy.   CARDIOVASCULAR: Normal S1, S2; no murmurs, rubs, or gallops.   RESPIRATORY: Normal chest excursion with respiration; breath sounds clear and equal bilaterally; no wheezes, rhonchi, or rales.  GI/: right lower quadrant tenderness only along the rectus abdominis otherwise non-distended; non-tender; no palpable organomegaly.   MS: No evidence of trauma or deformity. Normal ROM in all four extremities; non-tender to palpation; distal pulses are normal.   SKIN: Normal for age and race; warm; dry; good turgor; no apparent lesions or exudate.   NEURO/PSYCH: A & O x 4; grossly unremarkable. mood and manner are appropriate.

## 2023-05-12 NOTE — ED PROVIDER NOTE - OBJECTIVE STATEMENT
pt presents to ED c/o pt presents to ED c/o RLQ pain for 1 month. has had eval with GI which includes CT with PO and IV contrast (neg) and a colonoscopy. pain is sharp, nonradiating, moderate. denies exacerbating or relieving factors. Denies fever/chill/HA/dizziness/chest pain/palpitation/sob/n/v/d/ black stool/bloody stool/urinary sxs

## 2023-05-12 NOTE — ED ADULT TRIAGE NOTE - CHIEF COMPLAINT QUOTE
Bad severe pain here, distended, no appetite, it hurts when I drink and eat. Its been at least 4 weeks, - patient   Wife reports patient got a colonoscopy and endoscopy today, had a CT scan at [facility] recently, no blood work done

## 2023-05-12 NOTE — ED ADULT NURSE NOTE - NSFALLUNIVINTERV_ED_ALL_ED
Bed/Stretcher in lowest position, wheels locked, appropriate side rails in place/Call bell, personal items and telephone in reach/Instruct patient to call for assistance before getting out of bed/chair/stretcher/Non-slip footwear applied when patient is off stretcher/[place] to call system/Physically safe environment - no spills, clutter or unnecessary equipment/Purposeful proactive rounding/Room/bathroom lighting operational, light cord in reach

## 2023-05-13 VITALS
RESPIRATION RATE: 18 BRPM | HEART RATE: 55 BPM | DIASTOLIC BLOOD PRESSURE: 84 MMHG | SYSTOLIC BLOOD PRESSURE: 127 MMHG | TEMPERATURE: 97 F | OXYGEN SATURATION: 96 %

## 2023-05-14 LAB
CULTURE RESULTS: NO GROWTH — SIGNIFICANT CHANGE UP
SPECIMEN SOURCE: SIGNIFICANT CHANGE UP

## 2023-09-14 ENCOUNTER — APPOINTMENT (EMERGENCY)
Dept: VASCULAR ULTRASOUND | Facility: HOSPITAL | Age: 58
End: 2023-09-14
Payer: MEDICARE

## 2023-09-14 ENCOUNTER — HOSPITAL ENCOUNTER (EMERGENCY)
Facility: HOSPITAL | Age: 58
Discharge: HOME | End: 2023-09-14
Attending: EMERGENCY MEDICINE
Payer: MEDICARE

## 2023-09-14 ENCOUNTER — APPOINTMENT (EMERGENCY)
Dept: RADIOLOGY | Facility: HOSPITAL | Age: 58
End: 2023-09-14
Payer: MEDICARE

## 2023-09-14 VITALS
TEMPERATURE: 98.5 F | DIASTOLIC BLOOD PRESSURE: 84 MMHG | RESPIRATION RATE: 18 BRPM | HEART RATE: 84 BPM | OXYGEN SATURATION: 98 % | SYSTOLIC BLOOD PRESSURE: 131 MMHG

## 2023-09-14 DIAGNOSIS — M25.571 RIGHT ANKLE PAIN: Primary | ICD-10-CM

## 2023-09-14 LAB
ALBUMIN SERPL BCP-MCNC: 4.3 G/DL (ref 3.5–5)
ALP SERPL-CCNC: 49 U/L (ref 34–104)
ALT SERPL W P-5'-P-CCNC: 19 U/L (ref 7–52)
ANION GAP SERPL CALCULATED.3IONS-SCNC: 9 MMOL/L
AST SERPL W P-5'-P-CCNC: 17 U/L (ref 13–39)
ATRIAL RATE: 76 BPM
BASOPHILS # BLD AUTO: 0.02 THOUSANDS/ÂΜL (ref 0–0.1)
BASOPHILS NFR BLD AUTO: 0 % (ref 0–1)
BILIRUB SERPL-MCNC: 0.87 MG/DL (ref 0.2–1)
BUN SERPL-MCNC: 9 MG/DL (ref 5–25)
CALCIUM SERPL-MCNC: 10 MG/DL (ref 8.4–10.2)
CHLORIDE SERPL-SCNC: 104 MMOL/L (ref 96–108)
CO2 SERPL-SCNC: 22 MMOL/L (ref 21–32)
CREAT SERPL-MCNC: 1 MG/DL (ref 0.6–1.3)
EOSINOPHIL # BLD AUTO: 0.07 THOUSAND/ÂΜL (ref 0–0.61)
EOSINOPHIL NFR BLD AUTO: 1 % (ref 0–6)
ERYTHROCYTE [DISTWIDTH] IN BLOOD BY AUTOMATED COUNT: 13.1 % (ref 11.6–15.1)
GFR SERPL CREATININE-BSD FRML MDRD: 82 ML/MIN/1.73SQ M
GLUCOSE SERPL-MCNC: 106 MG/DL (ref 65–140)
HCT VFR BLD AUTO: 48.7 % (ref 36.5–49.3)
HGB BLD-MCNC: 16.6 G/DL (ref 12–17)
IMM GRANULOCYTES # BLD AUTO: 0.02 THOUSAND/UL (ref 0–0.2)
IMM GRANULOCYTES NFR BLD AUTO: 0 % (ref 0–2)
LYMPHOCYTES # BLD AUTO: 2.94 THOUSANDS/ÂΜL (ref 0.6–4.47)
LYMPHOCYTES NFR BLD AUTO: 22 % (ref 14–44)
MCH RBC QN AUTO: 30.9 PG (ref 26.8–34.3)
MCHC RBC AUTO-ENTMCNC: 34.1 G/DL (ref 31.4–37.4)
MCV RBC AUTO: 91 FL (ref 82–98)
MONOCYTES # BLD AUTO: 1.1 THOUSAND/ÂΜL (ref 0.17–1.22)
MONOCYTES NFR BLD AUTO: 8 % (ref 4–12)
NEUTROPHILS # BLD AUTO: 9.07 THOUSANDS/ÂΜL (ref 1.85–7.62)
NEUTS SEG NFR BLD AUTO: 69 % (ref 43–75)
NRBC BLD AUTO-RTO: 0 /100 WBCS
P AXIS: 54 DEGREES
PLATELET # BLD AUTO: 254 THOUSANDS/UL (ref 149–390)
PMV BLD AUTO: 9.3 FL (ref 8.9–12.7)
POTASSIUM SERPL-SCNC: 4 MMOL/L (ref 3.5–5.3)
PR INTERVAL: 140 MS
PROT SERPL-MCNC: 8.1 G/DL (ref 6.4–8.4)
QRS AXIS: 36 DEGREES
QRSD INTERVAL: 80 MS
QT INTERVAL: 378 MS
QTC INTERVAL: 425 MS
RBC # BLD AUTO: 5.38 MILLION/UL (ref 3.88–5.62)
SODIUM SERPL-SCNC: 135 MMOL/L (ref 135–147)
T WAVE AXIS: 72 DEGREES
VENTRICULAR RATE: 76 BPM
WBC # BLD AUTO: 13.22 THOUSAND/UL (ref 4.31–10.16)

## 2023-09-14 PROCEDURE — 99284 EMERGENCY DEPT VISIT MOD MDM: CPT

## 2023-09-14 PROCEDURE — 96374 THER/PROPH/DIAG INJ IV PUSH: CPT

## 2023-09-14 PROCEDURE — 36415 COLL VENOUS BLD VENIPUNCTURE: CPT | Performed by: PHYSICIAN ASSISTANT

## 2023-09-14 PROCEDURE — 99284 EMERGENCY DEPT VISIT MOD MDM: CPT | Performed by: PHYSICIAN ASSISTANT

## 2023-09-14 PROCEDURE — 73610 X-RAY EXAM OF ANKLE: CPT

## 2023-09-14 PROCEDURE — 93971 EXTREMITY STUDY: CPT | Performed by: SURGERY

## 2023-09-14 PROCEDURE — 93010 ELECTROCARDIOGRAM REPORT: CPT | Performed by: INTERNAL MEDICINE

## 2023-09-14 PROCEDURE — 80053 COMPREHEN METABOLIC PANEL: CPT | Performed by: PHYSICIAN ASSISTANT

## 2023-09-14 PROCEDURE — 93005 ELECTROCARDIOGRAM TRACING: CPT

## 2023-09-14 PROCEDURE — 93971 EXTREMITY STUDY: CPT

## 2023-09-14 PROCEDURE — 85025 COMPLETE CBC W/AUTO DIFF WBC: CPT | Performed by: PHYSICIAN ASSISTANT

## 2023-09-14 RX ORDER — COLCHICINE 0.6 MG/1
0.6 TABLET ORAL DAILY
Qty: 7 TABLET | Refills: 0 | Status: SHIPPED | OUTPATIENT
Start: 2023-09-14 | End: 2023-09-21

## 2023-09-14 RX ORDER — ONDANSETRON 4 MG/1
4 TABLET, ORALLY DISINTEGRATING ORAL EVERY 8 HOURS PRN
Qty: 15 TABLET | Refills: 0 | Status: SHIPPED | OUTPATIENT
Start: 2023-09-14

## 2023-09-14 RX ORDER — PREDNISONE 20 MG/1
60 TABLET ORAL DAILY
Qty: 12 TABLET | Refills: 0 | Status: SHIPPED | OUTPATIENT
Start: 2023-09-14 | End: 2023-09-14 | Stop reason: SDUPTHER

## 2023-09-14 RX ORDER — KETOROLAC TROMETHAMINE 30 MG/ML
15 INJECTION, SOLUTION INTRAMUSCULAR; INTRAVENOUS ONCE
Status: COMPLETED | OUTPATIENT
Start: 2023-09-14 | End: 2023-09-14

## 2023-09-14 RX ORDER — PREDNISONE 20 MG/1
60 TABLET ORAL DAILY
Qty: 12 TABLET | Refills: 0 | Status: SHIPPED | OUTPATIENT
Start: 2023-09-14 | End: 2023-09-18

## 2023-09-14 RX ORDER — PREDNISONE 20 MG/1
60 TABLET ORAL ONCE
Status: COMPLETED | OUTPATIENT
Start: 2023-09-14 | End: 2023-09-14

## 2023-09-14 RX ADMIN — PREDNISONE 60 MG: 20 TABLET ORAL at 18:51

## 2023-09-14 RX ADMIN — KETOROLAC TROMETHAMINE 15 MG: 30 INJECTION, SOLUTION INTRAMUSCULAR; INTRAVENOUS at 17:43

## 2023-09-14 NOTE — ED PROVIDER NOTES
History  Chief Complaint   Patient presents with   • Leg Pain     Severe pain in R foot radiating into leg and up into hip, L calf spasms, headaches, nausea. Denies redness and swelling in calves     This is a 49-year-old male patient presenting for evaluation of right ankle pain. Started last night getting worse today. Having spasms in his right calf. Pain shooting up his leg into his buttock. Pain also shoots to his foot. Denies any fevers chills rash or redness. Does have swelling but not localized to the joint itself. He denies any recent travels traumas or surgeries. No injuries that he can recall. He has history of gout and states this seems more severe than his usual gout but otherwise the character/quality of this pain feels similar. History provided by:  Patient   used: No        None       History reviewed. No pertinent past medical history. History reviewed. No pertinent surgical history. History reviewed. No pertinent family history. I have reviewed and agree with the history as documented. E-Cigarette/Vaping   • E-Cigarette Use Never User      E-Cigarette/Vaping Substances     Social History     Tobacco Use   • Smoking status: Never   • Smokeless tobacco: Never   Vaping Use   • Vaping Use: Never used   Substance Use Topics   • Drug use: Never       Review of Systems   Constitutional: Negative for chills and fever. HENT: Negative for ear pain and sore throat. Eyes: Negative for pain and visual disturbance. Respiratory: Negative for cough and shortness of breath. Cardiovascular: Negative for chest pain and palpitations. Gastrointestinal: Negative for abdominal pain and vomiting. Genitourinary: Negative for dysuria and hematuria. Musculoskeletal: Positive for arthralgias. Negative for back pain. Skin: Negative for color change and rash. Neurological: Negative for seizures and syncope.    All other systems reviewed and are negative. Physical Exam  Physical Exam  Vitals and nursing note reviewed. Constitutional:       General: He is not in acute distress. Appearance: He is well-developed. HENT:      Head: Normocephalic and atraumatic. Eyes:      Conjunctiva/sclera: Conjunctivae normal.   Cardiovascular:      Rate and Rhythm: Normal rate and regular rhythm. Pulses:           Dorsalis pedis pulses are 2+ on the right side. Heart sounds: No murmur heard. Pulmonary:      Effort: Pulmonary effort is normal. No respiratory distress. Breath sounds: Normal breath sounds. Abdominal:      Palpations: Abdomen is soft. Tenderness: There is no abdominal tenderness. Musculoskeletal:         General: No swelling. Cervical back: Neck supple. Right ankle: Swelling present. Tenderness present over the lateral malleolus and medial malleolus. Decreased range of motion. Skin:     General: Skin is warm and dry. Capillary Refill: Capillary refill takes less than 2 seconds. Neurological:      Mental Status: He is alert.    Psychiatric:         Mood and Affect: Mood normal.         Vital Signs  ED Triage Vitals [09/14/23 1649]   Temperature Pulse Respirations Blood Pressure SpO2   98.5 °F (36.9 °C) 84 18 134/87 98 %      Temp Source Heart Rate Source Patient Position - Orthostatic VS BP Location FiO2 (%)   Temporal Monitor Sitting Left arm --      Pain Score       --           Vitals:    09/14/23 1649 09/14/23 1847   BP: 134/87 131/84   Pulse: 84    Patient Position - Orthostatic VS: Sitting Sitting         Visual Acuity      ED Medications  Medications   ketorolac (TORADOL) injection 15 mg (15 mg Intravenous Given 9/14/23 1743)   predniSONE tablet 60 mg (60 mg Oral Given 9/14/23 1851)       Diagnostic Studies  Results Reviewed     Procedure Component Value Units Date/Time    Comprehensive metabolic panel [[id number]] Collected: 09/14/23 1748    Lab Status: Final result Specimen: Blood Updated: 09/14/23 1807     Sodium 135 mmol/L      Potassium 4.0 mmol/L      Chloride 104 mmol/L      CO2 22 mmol/L      ANION GAP 9 mmol/L      BUN 9 mg/dL      Creatinine 1.00 mg/dL      Glucose 106 mg/dL      Calcium 10.0 mg/dL      AST 17 U/L      ALT 19 U/L      Alkaline Phosphatase 49 U/L      Total Protein 8.1 g/dL      Albumin 4.3 g/dL      Total Bilirubin 0.87 mg/dL      eGFR 82 ml/min/1.73sq m     Narrative:      National Kidney Disease Foundation guidelines for Chronic Kidney Disease (CKD):   •  Stage 1 with normal or high GFR (GFR > 90 mL/min/1.73 square meters)  •  Stage 2 Mild CKD (GFR = 60-89 mL/min/1.73 square meters)  •  Stage 3A Moderate CKD (GFR = 45-59 mL/min/1.73 square meters)  •  Stage 3B Moderate CKD (GFR = 30-44 mL/min/1.73 square meters)  •  Stage 4 Severe CKD (GFR = 15-29 mL/min/1.73 square meters)  •  Stage 5 End Stage CKD (GFR <15 mL/min/1.73 square meters)  Note: GFR calculation is accurate only with a steady state creatinine    CBC and differential [[id number]]  (Abnormal) Collected: 09/14/23 1749    Lab Status: Final result Specimen: Blood Updated: 09/14/23 1749     WBC 13.22 Thousand/uL      RBC 5.38 Million/uL      Hemoglobin 16.6 g/dL      Hematocrit 48.7 %      MCV 91 fL      MCH 30.9 pg      MCHC 34.1 g/dL      RDW 13.1 %      MPV 9.3 fL      Platelets 127 Thousands/uL      nRBC 0 /100 WBCs      Neutrophils Relative 69 %      Immat GRANS % 0 %      Lymphocytes Relative 22 %      Monocytes Relative 8 %      Eosinophils Relative 1 %      Basophils Relative 0 %      Neutrophils Absolute 9.07 Thousands/µL      Immature Grans Absolute 0.02 Thousand/uL      Lymphocytes Absolute 2.94 Thousands/µL      Monocytes Absolute 1.10 Thousand/µL      Eosinophils Absolute 0.07 Thousand/µL      Basophils Absolute 0.02 Thousands/µL                  XR ankle 3+ views RIGHT    (Results Pending)   VAS lower limb venous duplex study, unilateral/limited    (Results Pending)              Procedures  ECG 12 Lead Documentation Only    Date/Time: 9/14/2023 6:36 PM    Performed by: Juliane Bahena PA-C  Authorized by: Juliane Bahena PA-C    ECG reviewed by me, the ED Provider: yes    Patient location:  ED  Interpretation:     Interpretation: normal    Quality:     Tracing quality:  Limited by artifact  Rate:     ECG rate:  76    ECG rate assessment: normal    Rhythm:     Rhythm: sinus rhythm    Ectopy:     Ectopy: none    QRS:     QRS axis:  Normal    QRS intervals:  Normal  Conduction:     Conduction: normal    ST segments:     ST segments:  Normal  T waves:     T waves: normal               ED Course  ED Course as of 09/14/23 1947   Thu Sep 14, 2023   1757 VAS lower limb venous duplex study, unilateral/limited  negative                               SBIRT 20yo+    Flowsheet Row Most Recent Value   Initial Alcohol Screen: US AUDIT-C     1. How often do you have a drink containing alcohol? 1 Filed at: 09/14/2023 1651   2. How many drinks containing alcohol do you have on a typical day you are drinking? 0 Filed at: 09/14/2023 1651   3a. Male UNDER 65: How often do you have five or more drinks on one occasion? 0 Filed at: 09/14/2023 1651   3b. FEMALE Any Age, or MALE 65+: How often do you have 4 or more drinks on one occassion? 0 Filed at: 09/14/2023 1651   Audit-C Score 1 Filed at: 09/14/2023 1651   AUNG: How many times in the past year have you. .. Used an illegal drug or used a prescription medication for non-medical reasons? Never Filed at: 09/14/2023 1651                    Medical Decision Making  Differential diagnosis including but not limited to: Gout, arthritis, Lyme disease, Lupus, rheumatoid arthritis, cellulitis, bursitis, tendinitis, dislocation, fracture, sprain, strain, DVT, electrolyte abnormality, Baker's cyst; doubt septic arthritis or arterial occlusion. Plan: Labs. Duplex. Dispo pending. MDM: 59year-old with ankle and leg pain. Unremarkable x-ray, ultrasound negative for DVT.   Could be strain or sprain, could be gout. Given he feels the character of this pain is similar to prior gout flares but more severe we will start steroids. He is requesting refill of his Colcrys. We discussed follow-up and return parameters. At this point time have low suspicion for septic arthritis. Patient agrees. Defer attempted arthrocentesis. Patient agrees with plan. Amount and/or Complexity of Data Reviewed  Labs: ordered. Radiology: ordered. Decision-making details documented in ED Course. Risk  Prescription drug management. Disposition  Final diagnoses:   Right ankle pain     Time reflects when diagnosis was documented in both MDM as applicable and the Disposition within this note     Time User Action Codes Description Comment    9/14/2023  6:46 PM Anita Morris Add [M25.571] Right ankle pain       ED Disposition     ED Disposition   Discharge    Condition   Stable    Date/Time   u Sep 14, 2023  6:46 PM    Comment   Kelly Lr discharge to home/self care. Follow-up Information     Follow up With Specialties Details Why Contact Info Additional [facility] Emergency Department Emergency Medicine Go to  If symptoms worsen [address] [facility] [address] [facility] Emergency Department, [place], [place], [address] [facility] [place] [facility]   [address] [id number]  [facility] [place], [address] [facility] [address], [place], [address]          There are no discharge medications for this patient.           PDMP Review     None          ED Provider  Electronically Signed by           Anita Morris PA-C  09/14/23 194

## 2023-10-11 NOTE — ED PROVIDER NOTE - NSFOLLOWUPINSTRUCTIONS_ED_ALL_ED_FT
LOV: 10/3/23  Pt requested that we ask the pharmacy for Teva brand for his refill Abdominal Pain, Adult  Abdominal pain can be caused by many things. Often, abdominal pain is not serious and it gets better with no treatment or by being treated at home. However, sometimes abdominal pain is serious. Your health care provider will do a medical history and a physical exam to try to determine the cause of your abdominal pain.    Follow these instructions at home:  Take over-the-counter and prescription medicines only as told by your health care provider. Do not take a laxative unless told by your health care provider.  ImageDrink enough fluid to keep your urine clear or pale yellow.  Watch your condition for any changes.  Keep all follow-up visits as told by your health care provider. This is important.  Contact a health care provider if:  Your abdominal pain changes or gets worse.  You are not hungry or you lose weight without trying.  You are constipated or have diarrhea for more than 2–3 days.  You have pain when you urinate or have a bowel movement.  Your abdominal pain wakes you up at night.  Your pain gets worse with meals, after eating, or with certain foods.  You are throwing up and cannot keep anything down.  You have a fever.  Get help right away if:  Your pain does not go away as soon as your health care provider told you to expect.  You cannot stop throwing up.  Your pain is only in areas of the abdomen, such as the right side or the left lower portion of the abdomen.  You have bloody or black stools, or stools that look like tar.  You have severe pain, cramping, or bloating in your abdomen.  You have signs of dehydration, such as:    Dark urine, very little urine, or no urine.  Cracked lips.  Dry mouth.  Sunken eyes.  Sleepiness.  Weakness.    This information is not intended to replace advice given to you by your health care provider. Make sure you discuss any questions you have with your health care provider

## 2023-10-26 ENCOUNTER — NON-APPOINTMENT (OUTPATIENT)
Age: 58
End: 2023-10-26

## 2023-12-21 ENCOUNTER — APPOINTMENT (OUTPATIENT)
Dept: CARDIOLOGY | Facility: CLINIC | Age: 58
End: 2023-12-21
Payer: MEDICARE

## 2023-12-21 VITALS
HEART RATE: 73 BPM | TEMPERATURE: 97.8 F | BODY MASS INDEX: 25.34 KG/M2 | HEIGHT: 78 IN | WEIGHT: 219 LBS | DIASTOLIC BLOOD PRESSURE: 82 MMHG | SYSTOLIC BLOOD PRESSURE: 132 MMHG

## 2023-12-21 DIAGNOSIS — R07.9 CHEST PAIN, UNSPECIFIED: ICD-10-CM

## 2023-12-21 DIAGNOSIS — R06.02 SHORTNESS OF BREATH: ICD-10-CM

## 2023-12-21 DIAGNOSIS — R73.03 PREDIABETES.: ICD-10-CM

## 2023-12-21 DIAGNOSIS — E78.5 HYPERLIPIDEMIA, UNSPECIFIED: ICD-10-CM

## 2023-12-21 PROCEDURE — 93000 ELECTROCARDIOGRAM COMPLETE: CPT

## 2023-12-21 PROCEDURE — 99214 OFFICE O/P EST MOD 30 MIN: CPT

## 2023-12-21 NOTE — HISTORY OF PRESENT ILLNESS
[FreeTextEntry1] : 58M with pre-DM, family hxo CAD, COVID infection 2021 (no hospitalization) here for follow-up  23 Pt is seen for f.u.  C/o chest discomfort. Occurs at rest and with exertion.  Pain lasts from 10 min to 45 min and resolves without interventions.   Pt reports new fatigue,  SOB with exertion, pt is not exercising for the past 4 months.  Was seen by PCP who ordered a sleep study test to r/o MAURICE  Blood test was done on 23 at PCP office.   3/10/2022 Used to be able to workout on stair machine for 45-60 minutes, now gasping for air after 15 minutes. Also has retrosternal chest discomfort that comes and go, lasting for 5-10 minutes at a time and resolving spontaneously. Can occur at rest, with exercise, standing, etc. Occurs up to 3x/day. No history of CAD. He has had a normal stress test in the past.   Diet: Grilled chicken, salad, rice, fish Red meat 1x per week Not drinking much water Veg, Bananas, apples, No cold cuts  Retired  , 2 adult sons living out of state Non smoker  Fam Hx Father: MI,  at 60 yo 3 mos after CABG Mother: cor stents in 70s, HTN Sister: SLE   COVID vaccine x2 COVID infection 2021

## 2023-12-21 NOTE — ASSESSMENT
[FreeTextEntry1] : Assessment: #Fatigue #Dyspnea on exertion - TTE 3/11/22 Nl LVEF 68%, no wall motion abnls, no AS - Exercise nuclear stress test 3/17/22 Patient achieved 13 METs, no ischemia on EKG, no ischemia on perfusion study #Chest discomfort - Has risk factors for CAD. NST with no perfusion defects 3/2022, however patient says symptoms are different than last year and fatigue is new #COVID infection 12/2021 #Pre-DM - 1/2021 HgA1c 5.9 #Dyslipidemia - 1/2021 , , HDL 64,  not on any cholesterol lowering medications - 3/10/22 , TG 67, HDL 71,  not on any cholesterol lowering medications #FMH of CAD #Pre-DM - 3/2022 Hgb A1c 5.8%  12/19/23 Hgb 16.5 K 5, Cr 1.1 A1c 6.1% TSH 1.48 , TG 72, HLD 80,  -- 7.5% Intermediate Current 10-Year ASCVD Risk  Plan: - Coronary CCTA - Agree with MAURICE evaluation - Encouraged plant-based and Mediterranean diets, along with increased fruit, nut, vegetable, legume, and lean vegetable or animal protein (preferably fish) consumption - TTM after CCTA with me or with HUSSIEN Limon if I am on maternity leave. Based on CT, will decide if statin should be initiated   I, Dr. Clemente, personally performed the evaluation and management (E/M) services for this established patient who presents today with (a) new problem(s)/exacerbation of (an) existing condition(s). That E/M includes conducting the clinically appropriate interval history &/or exam, assessing all new/exacerbated conditions, and establishing a new plan of care. Today, HUSSEIN Garcia was here to observe &/or participate in the visit & follow plan of care established by me.

## 2023-12-21 NOTE — CARDIOLOGY SUMMARY
[de-identified] : EKG 3/10/22: sinus ashley, nl axis, no ST changes EKG 12/21/23 NSR 73 bpm no ST changes  [de-identified] : Nuclear stress test 03/23/22 No ischemia EF 55%

## 2023-12-21 NOTE — REVIEW OF SYSTEMS
[SOB] : shortness of breath [Dyspnea on exertion] : dyspnea during exertion [Joint Pain] : joint pain [Fever] : no fever [Headache] : no headache [Chills] : no chills [Chest Discomfort] : chest discomfort [Lower Ext Edema] : no extremity edema [Leg Claudication] : no intermittent leg claudication [Palpitations] : no palpitations [Orthopnea] : no orthopnea [PND] : no PND [Syncope] : no syncope [Cough] : no cough [Abdominal Pain] : no abdominal pain [Rash] : no rash [Dizziness] : no dizziness [Confusion] : no confusion was observed

## 2024-01-08 ENCOUNTER — APPOINTMENT (OUTPATIENT)
Dept: CARDIOLOGY | Facility: CLINIC | Age: 59
End: 2024-01-08
Payer: MEDICARE

## 2024-01-08 DIAGNOSIS — Z00.00 ENCOUNTER FOR GENERAL ADULT MEDICAL EXAMINATION W/OUT ABNORMAL FINDINGS: ICD-10-CM

## 2024-01-08 PROCEDURE — 99441: CPT | Mod: 93

## 2024-01-08 RX ORDER — METOPROLOL TARTRATE 100 MG/1
100 TABLET, FILM COATED ORAL
Qty: 2 | Refills: 0 | Status: DISCONTINUED | COMMUNITY
Start: 2023-12-21 | End: 2024-01-08

## 2024-01-08 RX ORDER — ROSUVASTATIN CALCIUM 10 MG/1
10 TABLET, FILM COATED ORAL
Qty: 90 | Refills: 1 | Status: ACTIVE | COMMUNITY
Start: 2024-01-08 | End: 1900-01-01

## 2024-03-02 ENCOUNTER — NON-APPOINTMENT (OUTPATIENT)
Age: 59
End: 2024-03-02

## 2024-05-20 ENCOUNTER — APPOINTMENT (OUTPATIENT)
Dept: CARDIOLOGY | Facility: CLINIC | Age: 59
End: 2024-05-20

## 2024-06-03 ENCOUNTER — APPOINTMENT (OUTPATIENT)
Dept: CARDIOLOGY | Facility: CLINIC | Age: 59
End: 2024-06-03

## 2024-06-03 ENCOUNTER — NON-APPOINTMENT (OUTPATIENT)
Age: 59
End: 2024-06-03

## 2024-07-02 ENCOUNTER — EMERGENCY (EMERGENCY)
Facility: HOSPITAL | Age: 59
LOS: 0 days | Discharge: HOME | End: 2024-07-02
Attending: EMERGENCY MEDICINE
Payer: MEDICARE

## 2024-07-02 ENCOUNTER — APPOINTMENT (OUTPATIENT)
Dept: ULTRASOUND IMAGING | Facility: HOSPITAL | Age: 59
End: 2024-07-02

## 2024-07-02 VITALS
RESPIRATION RATE: 18 BRPM | SYSTOLIC BLOOD PRESSURE: 151 MMHG | DIASTOLIC BLOOD PRESSURE: 92 MMHG | OXYGEN SATURATION: 96 % | TEMPERATURE: 98 F | HEART RATE: 56 BPM

## 2024-07-02 VITALS
HEART RATE: 59 BPM | OXYGEN SATURATION: 99 % | WEIGHT: 250 LBS | RESPIRATION RATE: 18 BRPM | DIASTOLIC BLOOD PRESSURE: 85 MMHG | TEMPERATURE: 98 F | SYSTOLIC BLOOD PRESSURE: 158 MMHG

## 2024-07-02 DIAGNOSIS — M25.462 EFFUSION, LEFT KNEE: ICD-10-CM

## 2024-07-02 DIAGNOSIS — Z96.649 PRESENCE OF UNSPECIFIED ARTIFICIAL HIP JOINT: ICD-10-CM

## 2024-07-02 DIAGNOSIS — M25.552 PAIN IN LEFT HIP: ICD-10-CM

## 2024-07-02 DIAGNOSIS — Z88.0 ALLERGY STATUS TO PENICILLIN: ICD-10-CM

## 2024-07-02 DIAGNOSIS — M79.605 PAIN IN LEFT LEG: ICD-10-CM

## 2024-07-02 DIAGNOSIS — M25.562 PAIN IN LEFT KNEE: ICD-10-CM

## 2024-07-02 DIAGNOSIS — M10.9 GOUT, UNSPECIFIED: ICD-10-CM

## 2024-07-02 PROCEDURE — 99285 EMERGENCY DEPT VISIT HI MDM: CPT | Mod: GC

## 2024-07-02 PROCEDURE — 93970 EXTREMITY STUDY: CPT | Mod: 26

## 2024-07-02 PROCEDURE — 99283 EMERGENCY DEPT VISIT LOW MDM: CPT | Mod: 25

## 2024-07-02 PROCEDURE — L9997: CPT

## 2024-07-02 RX ORDER — ENOXAPARIN SODIUM 100 MG/ML
100 INJECTION SUBCUTANEOUS ONCE
Refills: 0 | Status: COMPLETED | OUTPATIENT
Start: 2024-07-02 | End: 2024-07-02

## 2024-07-02 RX ADMIN — ENOXAPARIN SODIUM 100 MILLIGRAM(S): 100 INJECTION SUBCUTANEOUS at 02:09

## 2024-08-21 ENCOUNTER — APPOINTMENT (EMERGENCY)
Dept: RADIOLOGY | Facility: HOSPITAL | Age: 59
End: 2024-08-21
Payer: MEDICARE

## 2024-08-21 ENCOUNTER — HOSPITAL ENCOUNTER (EMERGENCY)
Facility: HOSPITAL | Age: 59
Discharge: HOME | End: 2024-08-21
Attending: EMERGENCY MEDICINE
Payer: MEDICARE

## 2024-08-21 VITALS
SYSTOLIC BLOOD PRESSURE: 130 MMHG | HEART RATE: 64 BPM | DIASTOLIC BLOOD PRESSURE: 63 MMHG | RESPIRATION RATE: 16 BRPM | OXYGEN SATURATION: 95 % | TEMPERATURE: 97.9 F

## 2024-08-21 DIAGNOSIS — M25.561 ACUTE PAIN OF RIGHT KNEE: Primary | ICD-10-CM

## 2024-08-21 DIAGNOSIS — M25.561 PAIN AND SWELLING OF RIGHT KNEE: ICD-10-CM

## 2024-08-21 DIAGNOSIS — M25.461 PAIN AND SWELLING OF RIGHT KNEE: ICD-10-CM

## 2024-08-21 PROCEDURE — 73564 X-RAY EXAM KNEE 4 OR MORE: CPT

## 2024-08-21 PROCEDURE — 96372 THER/PROPH/DIAG INJ SC/IM: CPT

## 2024-08-21 PROCEDURE — 99283 EMERGENCY DEPT VISIT LOW MDM: CPT

## 2024-08-21 PROCEDURE — 99284 EMERGENCY DEPT VISIT MOD MDM: CPT | Performed by: EMERGENCY MEDICINE

## 2024-08-21 RX ORDER — KETOROLAC TROMETHAMINE 30 MG/ML
30 INJECTION, SOLUTION INTRAMUSCULAR; INTRAVENOUS ONCE
Status: COMPLETED | OUTPATIENT
Start: 2024-08-21 | End: 2024-08-21

## 2024-08-21 RX ORDER — PREDNISONE 20 MG/1
60 TABLET ORAL DAILY
Qty: 12 TABLET | Refills: 0 | Status: SHIPPED | OUTPATIENT
Start: 2024-08-21 | End: 2024-08-26 | Stop reason: ALTCHOICE

## 2024-08-21 RX ADMIN — KETOROLAC TROMETHAMINE 30 MG: 30 INJECTION, SOLUTION INTRAMUSCULAR; INTRAVENOUS at 14:11

## 2024-08-21 NOTE — ED PROVIDER NOTES
History  Chief Complaint   Patient presents with    Knee Swelling     Pt reports his right knee started swelling on Monday. Pt does not recall injuring it. Knee now has pain and it is uncomfortable to walk on.      58-year-old male no significant reported past history presenting with right knee pain and swelling.  Patient reports insidious onset of knee pain and swelling beginning on Monday.  Does not recall any specific injury but reports that he was moving over the weekend.  Denies any systemic symptoms such as fevers chills.  Reports progressively worsening pain and swelling Monday into Tuesday.  Reports resting all day yesterday and having some improvement in swelling and pain and movement.  Here today due to persistence of symptoms.  Denies any chest pain shortness of breath.  Denies any abdominal pain nausea vomiting diarrhea.  Denies any known wound or injury.  Denies any neurological changes such as motor or sensory deficits.  Denies any other complaints.  Chart reviewed.    History reviewed. No pertinent past medical history.  Family History: non-contributory  Social History          Prior to Admission Medications   Prescriptions Last Dose Informant Patient Reported? Taking?   colchicine (COLCRYS) 0.6 mg tablet   No No   Sig: Take 1 tablet (0.6 mg total) by mouth daily for 7 days   ondansetron (ZOFRAN-ODT) 4 mg disintegrating tablet   No No   Sig: Take 1 tablet (4 mg total) by mouth every 8 (eight) hours as needed for nausea      Facility-Administered Medications: None       History reviewed. No pertinent past medical history.    History reviewed. No pertinent surgical history.    History reviewed. No pertinent family history.  I have reviewed and agree with the history as documented.    E-Cigarette/Vaping    E-Cigarette Use Never User      E-Cigarette/Vaping Substances     Social History     Tobacco Use    Smoking status: Never    Smokeless tobacco: Never   Vaping Use    Vaping status: Never Used    Substance Use Topics    Drug use: Never       Review of Systems   Constitutional:  Negative for appetite change, chills, diaphoresis, fever and unexpected weight change.   HENT:  Negative for congestion and rhinorrhea.    Eyes:  Negative for photophobia and visual disturbance.   Respiratory:  Negative for cough, chest tightness and shortness of breath.    Cardiovascular:  Negative for chest pain, palpitations and leg swelling.   Gastrointestinal:  Negative for abdominal distention, abdominal pain, blood in stool, constipation, diarrhea, nausea and vomiting.   Genitourinary:  Negative for dysuria and hematuria.   Musculoskeletal:  Positive for arthralgias and joint swelling. Negative for back pain, neck pain and neck stiffness.   Skin:  Negative for color change, pallor, rash and wound.   Neurological:  Negative for dizziness, syncope, weakness, light-headedness and headaches.   Psychiatric/Behavioral:  Negative for agitation.    All other systems reviewed and are negative.      Physical Exam  Physical Exam  Vitals and nursing note reviewed.   Constitutional:       General: He is not in acute distress.     Appearance: Normal appearance. He is well-developed. He is not ill-appearing, toxic-appearing or diaphoretic.   HENT:      Head: Normocephalic and atraumatic.      Nose: Nose normal. No congestion or rhinorrhea.      Mouth/Throat:      Mouth: Mucous membranes are moist.      Pharynx: Oropharynx is clear. No oropharyngeal exudate or posterior oropharyngeal erythema.   Eyes:      General: No scleral icterus.        Right eye: No discharge.         Left eye: No discharge.      Extraocular Movements: Extraocular movements intact.      Conjunctiva/sclera: Conjunctivae normal.      Pupils: Pupils are equal, round, and reactive to light.   Neck:      Vascular: No JVD.      Trachea: No tracheal deviation.      Comments: Supple. Normal range of motion.   Cardiovascular:      Rate and Rhythm: Normal rate and regular  rhythm.      Heart sounds: Normal heart sounds. No murmur heard.     No friction rub. No gallop.      Comments: Normal rate and regular rhythm  Pulmonary:      Effort: Pulmonary effort is normal. No respiratory distress.      Breath sounds: Normal breath sounds. No stridor. No wheezing or rales.      Comments: Clear to auscultation bilaterally  Chest:      Chest wall: No tenderness.   Abdominal:      General: Bowel sounds are normal. There is no distension.      Palpations: Abdomen is soft.      Tenderness: There is no abdominal tenderness. There is no right CVA tenderness, left CVA tenderness, guarding or rebound.      Comments: Soft, nontender, nondistended.  Normal bowel sounds throughout   Musculoskeletal:         General: Swelling and tenderness present. No deformity or signs of injury. Normal range of motion.      Cervical back: Normal range of motion and neck supple. No rigidity. No muscular tenderness.      Right lower leg: No edema.      Left lower leg: No edema.      Comments: Diffuse right anterior knee swelling.  Stable through varus valgus stressing and anterior posterior drawer testing.  2+ DP PT pulses.  Tenderness primarily near patellar tendon.   Lymphadenopathy:      Cervical: No cervical adenopathy.   Skin:     General: Skin is warm and dry.      Coloration: Skin is not pale.      Findings: No erythema or rash.   Neurological:      General: No focal deficit present.      Mental Status: He is alert. Mental status is at baseline.      Sensory: No sensory deficit.      Motor: No weakness or abnormal muscle tone.      Coordination: Coordination normal.      Gait: Gait normal.      Comments: Alert.  Strength and sensation grossly intact.  Ambulatory without difficulty at baseline.    Psychiatric:         Behavior: Behavior normal.         Thought Content: Thought content normal.         Vital Signs  ED Triage Vitals   Temperature Pulse Respirations Blood Pressure SpO2   08/21/24 1338 08/21/24 1338  08/21/24 1338 08/21/24 1338 08/21/24 1338   97.9 °F (36.6 °C) 63 18 (!) 181/83 98 %      Temp src Heart Rate Source Patient Position - Orthostatic VS BP Location FiO2 (%)   -- 08/21/24 1338 08/21/24 1345 08/21/24 1345 --    Monitor Lying Right arm       Pain Score       08/21/24 1411       2           Vitals:    08/21/24 1338 08/21/24 1345   BP: (!) 181/83 (!) 181/83   Pulse: 63 64   Patient Position - Orthostatic VS:  Lying         Visual Acuity      ED Medications  Medications   ketorolac (TORADOL) injection 30 mg (30 mg Intramuscular Given 8/21/24 1411)       Diagnostic Studies  Results Reviewed       None                   XR knee 4+ views Right injury    (Results Pending)              Procedures  Procedures         ED Course                                 SBIRT 22yo+      Flowsheet Row Most Recent Value   Initial Alcohol Screen: US AUDIT-C     1. How often do you have a drink containing alcohol? 0 Filed at: 08/21/2024 1339   2. How many drinks containing alcohol do you have on a typical day you are drinking?  0 Filed at: 08/21/2024 1339   3a. Male UNDER 65: How often do you have five or more drinks on one occasion? 0 Filed at: 08/21/2024 1339   3b. FEMALE Any Age, or MALE 65+: How often do you have 4 or more drinks on one occassion? 0 Filed at: 08/21/2024 1339   Audit-C Score 0 Filed at: 08/21/2024 1339   AUNG: How many times in the past year have you...    Used an illegal drug or used a prescription medication for non-medical reasons? Never Filed at: 08/21/2024 1339                      Medical Decision Making  58-year-old male no significant reported past history presenting with right knee pain and swelling.  No observable infectious changes.  Neurovascularly intact.  Suspect swelling and pain likely related to recent moving and overuse.  Plan for x-ray and symptom management with IM medication.  Topical ice.  Reassess.    X-rays interpreted by me notable for some arthritis.  Knee pain and swelling  precautions. Discussed results and recommendations. Advised follow up PCP and ortho. Medication recommendations. Given instructions and return precautions. Patient/family at bedside acknowledged understanding of all written and verbal instructions and return precautions. Discharged.     Amount and/or Complexity of Data Reviewed  Radiology: ordered.    Risk  Prescription drug management.                 Disposition  Final diagnoses:   Acute pain of right knee   Pain and swelling of right knee     Time reflects when diagnosis was documented in both MDM as applicable and the Disposition within this note       Time User Action Codes Description Comment    8/21/2024  2:03 PM Juan Coreas Add [M25.561] Acute pain of right knee     8/21/2024  2:03 PM Juan Coreas Add [M25.561,  M25.461] Pain and swelling of right knee           ED Disposition       ED Disposition   Discharge    Condition   Stable    Date/Time   Wed Aug 21, 2024 1403    Comment   Silas Lr discharge to home/self care.                   Follow-up Information       Follow up With Specialties Details Why Contact Info Additional Information    Infolink  Call  for [phone]       [facility] Schedule an appointment as soon as possible for a visit in 1 week  [address] [place] [id number] [phone] [facility] [address] [place] [address], [place], [place], [address] [phone]            Patient's Medications   Discharge Prescriptions    PREDNISONE 20 MG TABLET    Take 3 tablets (60 mg total) by mouth daily for 4 days       Start Date: 8/21/2024 End Date: 8/25/2024       Order Dose: 60 mg       Quantity: 12 tablet    Refills: 0       No discharge procedures on file.    PDMP Review       None            ED Provider  Electronically Signed by             Juan Coreas MD  08/21/24 3535

## 2024-08-21 NOTE — DISCHARGE INSTRUCTIONS
Please follow up PCP and ortho.  Recommend rest, ice, elevation, compression.  Recommend tylenol 650 mg and ibuprofen 600 mg every 6 hours as needed for pain. Please return for severe chest pain, significant shortness of breath, severely worsening symptoms, or any other concerning signs or symptoms. Please refer to the following documents for additional instructions and return precautions.

## 2024-08-26 ENCOUNTER — OFFICE VISIT (OUTPATIENT)
Dept: OBGYN CLINIC | Facility: CLINIC | Age: 59
End: 2024-08-26
Payer: MEDICARE

## 2024-08-26 VITALS
OXYGEN SATURATION: 98 % | BODY MASS INDEX: 30.37 KG/M2 | HEART RATE: 61 BPM | DIASTOLIC BLOOD PRESSURE: 100 MMHG | WEIGHT: 224.2 LBS | SYSTOLIC BLOOD PRESSURE: 151 MMHG | HEIGHT: 72 IN

## 2024-08-26 DIAGNOSIS — L03.115 CELLULITIS OF RIGHT KNEE: Primary | ICD-10-CM

## 2024-08-26 PROCEDURE — 99204 OFFICE O/P NEW MOD 45 MIN: CPT | Performed by: ORTHOPAEDIC SURGERY

## 2024-08-26 RX ORDER — SULFAMETHOXAZOLE/TRIMETHOPRIM 800-160 MG
1 TABLET ORAL EVERY 12 HOURS SCHEDULED
Qty: 14 TABLET | Refills: 0 | Status: SHIPPED | OUTPATIENT
Start: 2024-08-26 | End: 2024-09-02

## 2024-08-26 NOTE — PROGRESS NOTES
Patient Name:  Silas Lr  MRN:  [id number]    Assessment & Plan     1. Cellulitis of right knee    Right knee infrapatellar bursitis without collection, mild cellulitis  X-rays reviewed in office today with patient  Continue motrin as needed for symptom management  Bactrim prescribed to patient's pharmacy on file, BID for 7 days  Finish prednisone prescription  Return precautions addressed including increase in redness, swelling, fever, chills, discharge  Follow up 2 weeks    Chief Complaint     Right knee pain    History of the Present Illness     Silas Lr is a 58 y.o. male with Right knee pain. He was seen in the ED on 8/21/2024 when he complained of right knee pain and swelling. Pain has been on and off for approximately 3 months. He takes ibuprofen twice daily. The patient denies injury. He has not tried physical therapy or any injections. The patient is retired. He enjoys walking and doing house work day to day. He has a history of gout in his knee. He denies recent bites, fevers, chills. He has three days left of a prednisone taper.    Review of Systems     Review of Systems   Constitutional:  Negative for chills and fever.   HENT:  Negative for ear pain and sore throat.    Eyes:  Negative for pain and visual disturbance.   Respiratory:  Negative for cough and shortness of breath.    Cardiovascular:  Negative for chest pain and palpitations.   Gastrointestinal:  Negative for abdominal pain and vomiting.   Genitourinary:  Negative for dysuria and hematuria.   Musculoskeletal:  Negative for arthralgias and back pain.   Skin:  Negative for color change and rash.   Neurological:  Negative for seizures and syncope.   All other systems reviewed and are negative.      Physical Exam     /100   Pulse 61   Ht 6' (1.829 m)   Wt 102 kg (224 lb 3.2 oz)   SpO2 98%   BMI 30.41 kg/m²     Right Knee  Range of motion from 0 to 95.    Tibial tubercle, no fluctuance   There is no  crepitus with range of motion.   There is no effusion.    There is tenderness over the tibial tubercle, patella tendon.    There is 5/5 quadriceps strength and equal tone.    The patient is able to perform a straight leg raise.      negative patellar grind test.  Anterior drawer tests is negative  negative Lachman Test.   Posterior drawer test is   negative   Varus stress testing reveals no instability at 0 and 30 degrees   Valgus stress testing reveals no instability at 0 and 30 degrees  Margaret's testing is negative   The patient is neurovascular intact distally.      Eyes:  Anicteric sclerae.  Neck:  Supple.  Lungs:  Normal respiratory effort.  Cardiovascular:  Capillary refill is less than 2 seconds.  Skin:  Intact without erythema.  Neurologic:  Sensation grossly intact to light touch.  Psychiatric:  Mood and affect are appropriate.    Data Review     I have personally reviewed pertinent films in PACS, and my interpretation follows:    X-rays taken 8/21/2024 of Right knee independently reviewed and demonstrate NWB, medial compartment joint space narrowing. No acute fracture or dislocation.    History reviewed. No pertinent past medical history.    History reviewed. No pertinent surgical history.    Allergies   Allergen Reactions    Penicillins Angioedema       Current Outpatient Medications on File Prior to Visit   Medication Sig Dispense Refill    [DISCONTINUED] colchicine (COLCRYS) 0.6 mg tablet Take 1 tablet (0.6 mg total) by mouth daily for 7 days (Patient not taking: Reported on 8/26/2024) 7 tablet 0    [DISCONTINUED] ondansetron (ZOFRAN-ODT) 4 mg disintegrating tablet Take 1 tablet (4 mg total) by mouth every 8 (eight) hours as needed for nausea (Patient not taking: Reported on 8/26/2024) 15 tablet 0    [DISCONTINUED] predniSONE 20 mg tablet Take 3 tablets (60 mg total) by mouth daily for 4 days (Patient not taking: Reported on 8/26/2024) 12 tablet 0     No current facility-administered medications on  file prior to visit.       Social History     Tobacco Use    Smoking status: Never    Smokeless tobacco: Never   Vaping Use    Vaping status: Never Used   Substance Use Topics    Drug use: Never       History reviewed. No pertinent family history.          Procedures Performed     Procedures  None.      Tabatha Kenney  Scribe Attestation      I,:  Tabatha Kenney am acting as a scribe while in the presence of the attending physician.:       I,:  Jorge Alberto Reyes, DO personally performed the services described in this documentation    as scribed in my presence.:

## 2024-08-30 ENCOUNTER — TELEPHONE (OUTPATIENT)
Age: 59
End: 2024-08-30

## 2024-08-30 ENCOUNTER — APPOINTMENT (EMERGENCY)
Dept: CT IMAGING | Facility: HOSPITAL | Age: 59
End: 2024-08-30
Payer: MEDICARE

## 2024-08-30 ENCOUNTER — HOSPITAL ENCOUNTER (EMERGENCY)
Facility: HOSPITAL | Age: 59
Discharge: HOME | End: 2024-08-30
Attending: EMERGENCY MEDICINE
Payer: MEDICARE

## 2024-08-30 VITALS
OXYGEN SATURATION: 99 % | BODY MASS INDEX: 30.47 KG/M2 | WEIGHT: 224.65 LBS | SYSTOLIC BLOOD PRESSURE: 164 MMHG | DIASTOLIC BLOOD PRESSURE: 84 MMHG | TEMPERATURE: 97.9 F | HEART RATE: 55 BPM | RESPIRATION RATE: 18 BRPM

## 2024-08-30 DIAGNOSIS — M25.569 KNEE PAIN: Primary | ICD-10-CM

## 2024-08-30 LAB
ALBUMIN SERPL BCG-MCNC: 4 G/DL (ref 3.5–5)
ALP SERPL-CCNC: 52 U/L (ref 34–104)
ALT SERPL W P-5'-P-CCNC: 26 U/L (ref 7–52)
ANION GAP SERPL CALCULATED.3IONS-SCNC: 5 MMOL/L (ref 4–13)
AST SERPL W P-5'-P-CCNC: 22 U/L (ref 13–39)
BASOPHILS # BLD AUTO: 0.04 THOUSANDS/ÂΜL (ref 0–0.1)
BASOPHILS NFR BLD AUTO: 0 % (ref 0–1)
BILIRUB SERPL-MCNC: 0.35 MG/DL (ref 0.2–1)
BUN SERPL-MCNC: 13 MG/DL (ref 5–25)
CALCIUM SERPL-MCNC: 9.3 MG/DL (ref 8.4–10.2)
CHLORIDE SERPL-SCNC: 105 MMOL/L (ref 96–108)
CO2 SERPL-SCNC: 29 MMOL/L (ref 21–32)
CREAT SERPL-MCNC: 1.02 MG/DL (ref 0.6–1.3)
CRP SERPL QL: 11.7 MG/L
EOSINOPHIL # BLD AUTO: 0.16 THOUSAND/ÂΜL (ref 0–0.61)
EOSINOPHIL NFR BLD AUTO: 1 % (ref 0–6)
ERYTHROCYTE [DISTWIDTH] IN BLOOD BY AUTOMATED COUNT: 12.9 % (ref 11.6–15.1)
ERYTHROCYTE [SEDIMENTATION RATE] IN BLOOD: 14 MM/HOUR (ref 0–19)
GFR SERPL CREATININE-BSD FRML MDRD: 79 ML/MIN/1.73SQ M
GLUCOSE SERPL-MCNC: 100 MG/DL (ref 65–140)
HCT VFR BLD AUTO: 46.1 % (ref 36.5–49.3)
HGB BLD-MCNC: 15.1 G/DL (ref 12–17)
IMM GRANULOCYTES # BLD AUTO: 0.04 THOUSAND/UL (ref 0–0.2)
IMM GRANULOCYTES NFR BLD AUTO: 0 % (ref 0–2)
LYMPHOCYTES # BLD AUTO: 2.79 THOUSANDS/ÂΜL (ref 0.6–4.47)
LYMPHOCYTES NFR BLD AUTO: 22 % (ref 14–44)
MCH RBC QN AUTO: 30.3 PG (ref 26.8–34.3)
MCHC RBC AUTO-ENTMCNC: 32.8 G/DL (ref 31.4–37.4)
MCV RBC AUTO: 93 FL (ref 82–98)
MONOCYTES # BLD AUTO: 0.98 THOUSAND/ÂΜL (ref 0.17–1.22)
MONOCYTES NFR BLD AUTO: 8 % (ref 4–12)
NEUTROPHILS # BLD AUTO: 8.58 THOUSANDS/ÂΜL (ref 1.85–7.62)
NEUTS SEG NFR BLD AUTO: 69 % (ref 43–75)
NRBC BLD AUTO-RTO: 0 /100 WBCS
PLATELET # BLD AUTO: 333 THOUSANDS/UL (ref 149–390)
PMV BLD AUTO: 8.9 FL (ref 8.9–12.7)
POTASSIUM SERPL-SCNC: 4.3 MMOL/L (ref 3.5–5.3)
PROCALCITONIN SERPL-MCNC: <0.05 NG/ML
PROT SERPL-MCNC: 7.4 G/DL (ref 6.4–8.4)
RBC # BLD AUTO: 4.98 MILLION/UL (ref 3.88–5.62)
SODIUM SERPL-SCNC: 139 MMOL/L (ref 135–147)
WBC # BLD AUTO: 12.59 THOUSAND/UL (ref 4.31–10.16)

## 2024-08-30 PROCEDURE — 86140 C-REACTIVE PROTEIN: CPT | Performed by: EMERGENCY MEDICINE

## 2024-08-30 PROCEDURE — 99284 EMERGENCY DEPT VISIT MOD MDM: CPT | Performed by: EMERGENCY MEDICINE

## 2024-08-30 PROCEDURE — 85025 COMPLETE CBC W/AUTO DIFF WBC: CPT | Performed by: EMERGENCY MEDICINE

## 2024-08-30 PROCEDURE — 36415 COLL VENOUS BLD VENIPUNCTURE: CPT | Performed by: EMERGENCY MEDICINE

## 2024-08-30 PROCEDURE — 80053 COMPREHEN METABOLIC PANEL: CPT | Performed by: EMERGENCY MEDICINE

## 2024-08-30 PROCEDURE — 96365 THER/PROPH/DIAG IV INF INIT: CPT

## 2024-08-30 PROCEDURE — 84145 PROCALCITONIN (PCT): CPT | Performed by: EMERGENCY MEDICINE

## 2024-08-30 PROCEDURE — 73701 CT LOWER EXTREMITY W/DYE: CPT

## 2024-08-30 PROCEDURE — 85652 RBC SED RATE AUTOMATED: CPT | Performed by: EMERGENCY MEDICINE

## 2024-08-30 PROCEDURE — 99283 EMERGENCY DEPT VISIT LOW MDM: CPT

## 2024-08-30 PROCEDURE — 96375 TX/PRO/DX INJ NEW DRUG ADDON: CPT

## 2024-08-30 PROCEDURE — 96367 TX/PROPH/DG ADDL SEQ IV INF: CPT

## 2024-08-30 RX ORDER — CEFAZOLIN SODIUM 2 G/50ML
2000 SOLUTION INTRAVENOUS ONCE
Status: COMPLETED | OUTPATIENT
Start: 2024-08-30 | End: 2024-08-30

## 2024-08-30 RX ORDER — ACETAMINOPHEN 10 MG/ML
1000 INJECTION, SOLUTION INTRAVENOUS ONCE
Status: COMPLETED | OUTPATIENT
Start: 2024-08-30 | End: 2024-08-30

## 2024-08-30 RX ORDER — OXYCODONE HYDROCHLORIDE 5 MG/1
5 TABLET ORAL EVERY 6 HOURS PRN
Qty: 10 TABLET | Refills: 0 | Status: SHIPPED | OUTPATIENT
Start: 2024-08-30

## 2024-08-30 RX ORDER — CEPHALEXIN 500 MG/1
500 CAPSULE ORAL EVERY 6 HOURS SCHEDULED
Qty: 28 CAPSULE | Refills: 0 | Status: SHIPPED | OUTPATIENT
Start: 2024-08-30 | End: 2024-09-06

## 2024-08-30 RX ORDER — MORPHINE SULFATE 4 MG/ML
4 INJECTION, SOLUTION INTRAMUSCULAR; INTRAVENOUS ONCE
Status: COMPLETED | OUTPATIENT
Start: 2024-08-30 | End: 2024-08-30

## 2024-08-30 RX ORDER — KETOROLAC TROMETHAMINE 30 MG/ML
15 INJECTION, SOLUTION INTRAMUSCULAR; INTRAVENOUS ONCE
Status: COMPLETED | OUTPATIENT
Start: 2024-08-30 | End: 2024-08-30

## 2024-08-30 RX ADMIN — CEFAZOLIN SODIUM 2000 MG: 2 SOLUTION INTRAVENOUS at 12:32

## 2024-08-30 RX ADMIN — MORPHINE SULFATE 4 MG: 4 INJECTION INTRAVENOUS at 13:41

## 2024-08-30 RX ADMIN — ACETAMINOPHEN 1000 MG: 10 INJECTION INTRAVENOUS at 12:15

## 2024-08-30 RX ADMIN — IOHEXOL 100 ML: 350 INJECTION, SOLUTION INTRAVENOUS at 12:55

## 2024-08-30 RX ADMIN — KETOROLAC TROMETHAMINE 15 MG: 30 INJECTION, SOLUTION INTRAMUSCULAR at 12:15

## 2024-08-30 NOTE — ED PROVIDER NOTES
History  Chief Complaint   Patient presents with   • Leg Pain     Patient arrived to ER c/o rt leg pain and redness that started 2 weeks ago. Worsening the last few days. Ortho DR placed on antibiotics for infection but symptoms are worsening. 10/10 pain that radiates from underneath rt knee to shin. Denies injury.      Is a 58-year-old male no past medical history presenting with leg pain.  Patient notes pain inferior to his  right knee which he states has been for about 2 weeks and worsening.  Was seen by orthopedics earlier this week and started on Bactrim which she states he has been taking for 2 days with no relief and states that is worsening and redness started to radiate down his calf.  He has been intermittently taking Aleve and Advil last dose 2 days ago with minimal relief.  Denies any falls or injuries, fevers, nausea or vomiting, chest pain, shortness of breath, dizziness.        Prior to Admission Medications   Prescriptions Last Dose Informant Patient Reported? Taking?   sulfamethoxazole-trimethoprim (BACTRIM DS) 800-160 mg per tablet   No No   Sig: Take 1 tablet by mouth every 12 (twelve) hours for 7 days      Facility-Administered Medications: None       History reviewed. No pertinent past medical history.    History reviewed. No pertinent surgical history.    History reviewed. No pertinent family history.  I have reviewed and agree with the history as documented.    E-Cigarette/Vaping   • E-Cigarette Use Never User      E-Cigarette/Vaping Substances     Social History     Tobacco Use   • Smoking status: Never   • Smokeless tobacco: Never   Vaping Use   • Vaping status: Never Used   Substance Use Topics   • Drug use: Never       Review of Systems   All other systems reviewed and are negative.      Physical Exam  Physical Exam  Vitals reviewed.   Constitutional:       General: He is not in acute distress.     Appearance: Normal appearance. He is not ill-appearing.   HENT:      Mouth/Throat:       Mouth: Mucous membranes are moist.   Eyes:      Conjunctiva/sclera: Conjunctivae normal.   Cardiovascular:      Rate and Rhythm: Normal rate and regular rhythm.      Pulses: Normal pulses.      Heart sounds: Normal heart sounds.   Pulmonary:      Effort: Pulmonary effort is normal.      Breath sounds: Normal breath sounds.   Abdominal:      General: Abdomen is flat.      Palpations: Abdomen is soft.      Tenderness: There is no abdominal tenderness.   Musculoskeletal:         General: Tenderness present. No swelling. Normal range of motion.      Cervical back: Neck supple.      Comments: Patient has focal erythema and increased warmth and tenderness roughly 2 cm inferior to the knee over the tibial plateau with no fluctuance induration or crepitus in this area with otherwise no tenderness to palpation throughout the leg with intact motor, sensation, pulses.  Mildly painful range of motion of the knee however intact.   Skin:     General: Skin is warm and dry.   Neurological:      General: No focal deficit present.      Mental Status: He is alert.      Sensory: No sensory deficit.      Motor: No weakness.   Psychiatric:         Mood and Affect: Mood normal.         Vital Signs  ED Triage Vitals [08/30/24 1130]   Temperature Pulse Respirations Blood Pressure SpO2   97.9 °F (36.6 °C) 69 17 148/85 100 %      Temp Source Heart Rate Source Patient Position - Orthostatic VS BP Location FiO2 (%)   Temporal Monitor Sitting Left arm --      Pain Score       --           Vitals:    08/30/24 1130 08/30/24 1145   BP: 148/85 164/84   Pulse: 69 68   Patient Position - Orthostatic VS: Sitting          Visual Acuity      ED Medications  Medications - No data to display    Diagnostic Studies  Results Reviewed       Procedure Component Value Units Date/Time    Procalcitonin [[id number]]     Lab Status: No result Specimen: Blood     Sedimentation rate, automated [[id number]]     Lab Status: No result Specimen: Blood     C-reactive protein  [[id number]]     Lab Status: No result Specimen: Blood     Comprehensive metabolic panel [[id number]]     Lab Status: No result Specimen: Blood     CBC and differential [[id number]]     Lab Status: No result Specimen: Blood                    CT lower extremity w contrast left    (Results Pending)              Procedures  Procedures         ED Course  ED Course as of 08/30/24 1431   Fri Aug 30, 2024   1342 Patient with nonspecific soft tissue edema, will treat for cellulitis with Keflex and have discussed return precautions and outpatient follow-up and patient states he understands.  Have given pain control.                                 SBIRT 20yo+      Flowsheet Row Most Recent Value   Initial Alcohol Screen: US AUDIT-C     1. How often do you have a drink containing alcohol? 0 Filed at: 08/30/2024 1130   2. How many drinks containing alcohol do you have on a typical day you are drinking?  0 Filed at: 08/30/2024 1130   3a. Male UNDER 65: How often do you have five or more drinks on one occasion? 0 Filed at: 08/30/2024 1130   Audit-C Score 0 Filed at: 08/30/2024 1130   AUNG: How many times in the past year have you...    Used an illegal drug or used a prescription medication for non-medical reasons? Never Filed at: 08/30/2024 1130                      Medical Decision Making  Patient is a 58-year-old male no past medical history presenting with leg pain.  Patient is well-appearing at bedside with stable vitals and in no acute distress.  He has focal erythema and tenderness to the tibial plateau and anterior shin with no other tenderness, erythema, edema, increased warmth and intact distal motor, sensation, pulses with full range of motion of the knee though painful and no edema, erythema or increased warmth of the knee itself.  Suspect prepatellar bursitis and patient likely requires coverage with cephalosporin however will obtain labs to assess for leukocytosis, electrolyte MIs, anemia, CT to assess for abscess or  other pathology, give pain control and reassess.    Amount and/or Complexity of Data Reviewed  Labs: ordered.  Radiology: ordered.                 Disposition  Final diagnoses:   None     ED Disposition       None          Follow-up Information    None         Patient's Medications   Discharge Prescriptions    No medications on file       No discharge procedures on file.    PDMP Review       None            ED Provider  Electronically Signed by             Loni Martinez DO  08/30/24 1397

## 2024-08-30 NOTE — ED NOTES
Per provider, blood cultures not required prior to Ancef administration.     Nay Moreira, RN  08/30/24 9971

## 2024-08-30 NOTE — TELEPHONE ENCOUNTER
Caller: Spouse/Vi Lea    Doctor: Amy    Reason for call: Patient is still taking the antibiotic. The patients pain level hasn't improved. Having difficulty walking. Questioned if the patient should have a blood test or an MRI? No redness/heat/sob/chest pain    Call back#: [phone]

## 2024-08-30 NOTE — TELEPHONE ENCOUNTER
Called and spoke w/pt's wife, Vi, on med comm form, and they are very concerned.  Pt has not improved on Bactrim 1 pill twice a day started on 8/26/24.  He is still having pain, difficulty walking, painful to bend knee, stride not tonya.. Some swelling to knee, no redness, no warmth, no fever. Pt is tired. He is resting, icing, compressing, elevating.  Does not use cane/walker to offload. He takes aleve or advil for pain which is not helping. She does not think he is taking tylenol.  They thought that pt should have improved by today on Bactrim and he finishes dosing on Sunday.  He has not improved. They would like labs or testing done to figure out what is wrong. Will forward to Dr Reyes for further advice.    Advised should pt get worse over the long weekend, can go to ED for further evaluation.  Wife states she understands. States they have been to ED in past for this. Just want to remind her that that is a resource available to her and the pt.

## 2024-09-02 ENCOUNTER — HOSPITAL ENCOUNTER (INPATIENT)
Facility: HOSPITAL | Age: 59
LOS: 2 days | Discharge: HOME | DRG: 603 | End: 2024-09-05
Attending: EMERGENCY MEDICINE | Admitting: INTERNAL MEDICINE
Payer: MEDICARE

## 2024-09-02 ENCOUNTER — APPOINTMENT (EMERGENCY)
Dept: VASCULAR ULTRASOUND | Facility: HOSPITAL | Age: 59
DRG: 603 | End: 2024-09-02
Payer: MEDICARE

## 2024-09-02 DIAGNOSIS — M25.561 PAIN AND SWELLING OF RIGHT KNEE: ICD-10-CM

## 2024-09-02 DIAGNOSIS — M10.061 ACUTE IDIOPATHIC GOUT OF RIGHT KNEE: Primary | ICD-10-CM

## 2024-09-02 DIAGNOSIS — M79.604 RIGHT LEG PAIN: ICD-10-CM

## 2024-09-02 DIAGNOSIS — M71.9 BURSITIS: ICD-10-CM

## 2024-09-02 DIAGNOSIS — M25.461 PAIN AND SWELLING OF RIGHT KNEE: ICD-10-CM

## 2024-09-02 PROBLEM — R26.2 AMBULATORY DYSFUNCTION: Status: ACTIVE | Noted: 2024-09-02

## 2024-09-02 LAB
ALBUMIN SERPL BCG-MCNC: 4.3 G/DL (ref 3.5–5)
ALP SERPL-CCNC: 63 U/L (ref 34–104)
ALT SERPL W P-5'-P-CCNC: 28 U/L (ref 7–52)
ANION GAP SERPL CALCULATED.3IONS-SCNC: 6 MMOL/L (ref 4–13)
APPEARANCE FLD: ABNORMAL
AST SERPL W P-5'-P-CCNC: 21 U/L (ref 13–39)
BASOPHILS # BLD AUTO: 0.04 THOUSANDS/ÂΜL (ref 0–0.1)
BASOPHILS NFR BLD AUTO: 0 % (ref 0–1)
BILIRUB SERPL-MCNC: 0.6 MG/DL (ref 0.2–1)
BUN SERPL-MCNC: 13 MG/DL (ref 5–25)
CALCIUM SERPL-MCNC: 10.1 MG/DL (ref 8.4–10.2)
CHLORIDE SERPL-SCNC: 100 MMOL/L (ref 96–108)
CO2 SERPL-SCNC: 30 MMOL/L (ref 21–32)
COLOR FLD: YELLOW
CREAT SERPL-MCNC: 1.21 MG/DL (ref 0.6–1.3)
CRP SERPL QL: 20.1 MG/L
CRYSTALS SNV QL MICRO: NORMAL
EOSINOPHIL # BLD AUTO: 0.13 THOUSAND/ÂΜL (ref 0–0.61)
EOSINOPHIL NFR BLD AUTO: 1 % (ref 0–6)
ERYTHROCYTE [DISTWIDTH] IN BLOOD BY AUTOMATED COUNT: 12.5 % (ref 11.6–15.1)
ERYTHROCYTE [SEDIMENTATION RATE] IN BLOOD: 21 MM/HOUR (ref 0–19)
GFR SERPL CREATININE-BSD FRML MDRD: 64 ML/MIN/1.73SQ M
GLUCOSE SERPL-MCNC: 90 MG/DL (ref 65–140)
GRAM STN SPEC: NORMAL
GRAM STN SPEC: NORMAL
HCT VFR BLD AUTO: 50.2 % (ref 36.5–49.3)
HGB BLD-MCNC: 16.5 G/DL (ref 12–17)
HISTIOCYTES NFR SNV MANUAL: 11 %
IMM GRANULOCYTES # BLD AUTO: 0.05 THOUSAND/UL (ref 0–0.2)
IMM GRANULOCYTES NFR BLD AUTO: 0 % (ref 0–2)
LACTATE SERPL-SCNC: 1 MMOL/L (ref 0.5–2)
LYMPHOCYTES # BLD AUTO: 2.54 THOUSANDS/ÂΜL (ref 0.6–4.47)
LYMPHOCYTES # SNV MANUAL: 3 %
LYMPHOCYTES NFR BLD AUTO: 21 % (ref 14–44)
MCH RBC QN AUTO: 30.5 PG (ref 26.8–34.3)
MCHC RBC AUTO-ENTMCNC: 32.9 G/DL (ref 31.4–37.4)
MCV RBC AUTO: 93 FL (ref 82–98)
MONOCYTES # BLD AUTO: 0.7 THOUSAND/ÂΜL (ref 0.17–1.22)
MONOCYTES NFR BLD AUTO: 6 % (ref 4–12)
MONOCYTES NFR SNV MANUAL: 11 %
NEUTROPHILS # BLD AUTO: 8.73 THOUSANDS/ÂΜL (ref 1.85–7.62)
NEUTROPHILS NFR SNV MANUAL: 75 %
NEUTS SEG NFR BLD AUTO: 72 % (ref 43–75)
NRBC BLD AUTO-RTO: 0 /100 WBCS
PLATELET # BLD AUTO: 352 THOUSANDS/UL (ref 149–390)
PMV BLD AUTO: 8.9 FL (ref 8.9–12.7)
POTASSIUM SERPL-SCNC: 4.8 MMOL/L (ref 3.5–5.3)
PROCALCITONIN SERPL-MCNC: 0.06 NG/ML
PROT SERPL-MCNC: 8.3 G/DL (ref 6.4–8.4)
RBC # BLD AUTO: 5.41 MILLION/UL (ref 3.88–5.62)
SODIUM SERPL-SCNC: 136 MMOL/L (ref 135–147)
TOTAL CELLS COUNTED SPEC: 100
WBC # BLD AUTO: 12.19 THOUSAND/UL (ref 4.31–10.16)
WBC # FLD MANUAL: 1816 /UL (ref 0–200)

## 2024-09-02 PROCEDURE — 86140 C-REACTIVE PROTEIN: CPT | Performed by: STUDENT IN AN ORGANIZED HEALTH CARE EDUCATION/TRAINING PROGRAM

## 2024-09-02 PROCEDURE — 0S9C3ZX DRAINAGE OF RIGHT KNEE JOINT, PERCUTANEOUS APPROACH, DIAGNOSTIC: ICD-10-PCS | Performed by: FAMILY MEDICINE

## 2024-09-02 PROCEDURE — 89060 EXAM SYNOVIAL FLUID CRYSTALS: CPT | Performed by: INTERNAL MEDICINE

## 2024-09-02 PROCEDURE — 85652 RBC SED RATE AUTOMATED: CPT | Performed by: STUDENT IN AN ORGANIZED HEALTH CARE EDUCATION/TRAINING PROGRAM

## 2024-09-02 PROCEDURE — 93971 EXTREMITY STUDY: CPT | Performed by: SURGERY

## 2024-09-02 PROCEDURE — 99284 EMERGENCY DEPT VISIT MOD MDM: CPT

## 2024-09-02 PROCEDURE — 89051 BODY FLUID CELL COUNT: CPT | Performed by: INTERNAL MEDICINE

## 2024-09-02 PROCEDURE — 83605 ASSAY OF LACTIC ACID: CPT | Performed by: STUDENT IN AN ORGANIZED HEALTH CARE EDUCATION/TRAINING PROGRAM

## 2024-09-02 PROCEDURE — 87040 BLOOD CULTURE FOR BACTERIA: CPT | Performed by: STUDENT IN AN ORGANIZED HEALTH CARE EDUCATION/TRAINING PROGRAM

## 2024-09-02 PROCEDURE — 99222 1ST HOSP IP/OBS MODERATE 55: CPT | Performed by: INTERNAL MEDICINE

## 2024-09-02 PROCEDURE — 80053 COMPREHEN METABOLIC PANEL: CPT | Performed by: STUDENT IN AN ORGANIZED HEALTH CARE EDUCATION/TRAINING PROGRAM

## 2024-09-02 PROCEDURE — 84145 PROCALCITONIN (PCT): CPT | Performed by: STUDENT IN AN ORGANIZED HEALTH CARE EDUCATION/TRAINING PROGRAM

## 2024-09-02 PROCEDURE — 85025 COMPLETE CBC W/AUTO DIFF WBC: CPT | Performed by: STUDENT IN AN ORGANIZED HEALTH CARE EDUCATION/TRAINING PROGRAM

## 2024-09-02 PROCEDURE — 86618 LYME DISEASE ANTIBODY: CPT | Performed by: STUDENT IN AN ORGANIZED HEALTH CARE EDUCATION/TRAINING PROGRAM

## 2024-09-02 PROCEDURE — 96374 THER/PROPH/DIAG INJ IV PUSH: CPT

## 2024-09-02 PROCEDURE — 93971 EXTREMITY STUDY: CPT

## 2024-09-02 PROCEDURE — 36415 COLL VENOUS BLD VENIPUNCTURE: CPT | Performed by: STUDENT IN AN ORGANIZED HEALTH CARE EDUCATION/TRAINING PROGRAM

## 2024-09-02 PROCEDURE — 96375 TX/PRO/DX INJ NEW DRUG ADDON: CPT

## 2024-09-02 PROCEDURE — 99214 OFFICE O/P EST MOD 30 MIN: CPT | Performed by: PHYSICIAN ASSISTANT

## 2024-09-02 RX ORDER — MAGNESIUM HYDROXIDE/ALUMINUM HYDROXICE/SIMETHICONE 120; 1200; 1200 MG/30ML; MG/30ML; MG/30ML
30 SUSPENSION ORAL EVERY 6 HOURS PRN
Status: DISCONTINUED | OUTPATIENT
Start: 2024-09-02 | End: 2024-09-05 | Stop reason: HOSPADM

## 2024-09-02 RX ORDER — CLINDAMYCIN PHOSPHATE 600 MG/50ML
600 INJECTION, SOLUTION INTRAVENOUS ONCE
Status: COMPLETED | OUTPATIENT
Start: 2024-09-02 | End: 2024-09-02

## 2024-09-02 RX ORDER — ONDANSETRON 2 MG/ML
4 INJECTION INTRAMUSCULAR; INTRAVENOUS EVERY 6 HOURS PRN
Status: DISCONTINUED | OUTPATIENT
Start: 2024-09-02 | End: 2024-09-05 | Stop reason: HOSPADM

## 2024-09-02 RX ORDER — OXYCODONE HYDROCHLORIDE 5 MG/1
5 TABLET ORAL EVERY 6 HOURS PRN
Status: DISCONTINUED | OUTPATIENT
Start: 2024-09-02 | End: 2024-09-03

## 2024-09-02 RX ORDER — LIDOCAINE HYDROCHLORIDE 10 MG/ML
10 INJECTION, SOLUTION EPIDURAL; INFILTRATION; INTRACAUDAL; PERINEURAL ONCE
Status: COMPLETED | OUTPATIENT
Start: 2024-09-02 | End: 2024-09-02

## 2024-09-02 RX ORDER — MORPHINE SULFATE 4 MG/ML
4 INJECTION, SOLUTION INTRAMUSCULAR; INTRAVENOUS ONCE
Status: COMPLETED | OUTPATIENT
Start: 2024-09-02 | End: 2024-09-02

## 2024-09-02 RX ORDER — ACETAMINOPHEN 325 MG/1
650 TABLET ORAL EVERY 6 HOURS PRN
Status: DISCONTINUED | OUTPATIENT
Start: 2024-09-02 | End: 2024-09-05 | Stop reason: HOSPADM

## 2024-09-02 RX ORDER — ENOXAPARIN SODIUM 100 MG/ML
40 INJECTION SUBCUTANEOUS
Status: DISCONTINUED | OUTPATIENT
Start: 2024-09-03 | End: 2024-09-05 | Stop reason: HOSPADM

## 2024-09-02 RX ORDER — CLINDAMYCIN PHOSPHATE 600 MG/50ML
600 INJECTION, SOLUTION INTRAVENOUS EVERY 8 HOURS
Status: DISCONTINUED | OUTPATIENT
Start: 2024-09-03 | End: 2024-09-04

## 2024-09-02 RX ORDER — ONDANSETRON 2 MG/ML
4 INJECTION INTRAMUSCULAR; INTRAVENOUS ONCE
Status: COMPLETED | OUTPATIENT
Start: 2024-09-02 | End: 2024-09-02

## 2024-09-02 RX ADMIN — CLINDAMYCIN PHOSPHATE 600 MG: 600 INJECTION, SOLUTION INTRAVENOUS at 17:23

## 2024-09-02 RX ADMIN — MORPHINE SULFATE 4 MG: 4 INJECTION INTRAVENOUS at 11:51

## 2024-09-02 RX ADMIN — LIDOCAINE HYDROCHLORIDE 10 ML: 10 INJECTION, SOLUTION EPIDURAL; INFILTRATION; INTRACAUDAL at 15:12

## 2024-09-02 RX ADMIN — ONDANSETRON 4 MG: 2 INJECTION INTRAMUSCULAR; INTRAVENOUS at 11:51

## 2024-09-02 RX ADMIN — ONDANSETRON 4 MG: 2 INJECTION INTRAMUSCULAR; INTRAVENOUS at 19:09

## 2024-09-02 RX ADMIN — OXYCODONE HYDROCHLORIDE 5 MG: 5 TABLET ORAL at 17:23

## 2024-09-02 NOTE — ASSESSMENT & PLAN NOTE
Patient still complaining of pain and inability to bear weight.  Imaging so far of the knee has been inconclusive  Very low concern for septic arthritis but will give the patient clindamycin 600 mg IV every 8 hours especially given that he failed 2 courses of outpatient antibiotics with Keflex and Bactrim and is allergic to penicillin  Right knee joint aspiration: monosodium urate crystals  MRI Ordered, pending  Orthopedics consulted and their input appreciated  Patient reports not wanting to take oxycodone, makes him nauseous and doesn't like the way it makes him feel.  Tylenol having little effect.  Toradol for pain management

## 2024-09-02 NOTE — ASSESSMENT & PLAN NOTE
Patient presents with 2-week duration of  right knee effusion, severe pain.  Cannot rule out septic knee joint at this time,   hx of gout.  Today the patient complains of inability to bear weight on the right knee and severe pain even with minimal movement  Imaging so far of the knee has been inconclusive  Right knee joint aspiration and MRI of right knee to be done  Orthopedics consulted and their input appreciated

## 2024-09-02 NOTE — CONSULTS
Orthopedics   Silas Lr 58 y.o. male MRN: [id number]  Unit/Bed#: -01      Chief Complaint:   Right knee pain    HPI:  58 y.o. male with a significant past medical history of gout currently complaining of right knee/leg pain.  Patient states he has been experiencing right knee pain ongoing for the past 2 weeks in duration with no injury of note.  Patient states that the onset of his pain was relatively rapid and severe.  Patient states that he was seen multiple times in the emergency room and was prescribed multiple courses of antibiotics and oral steroids which provided minimal relief of symptoms.  Patient was seen and evaluated by Dr. Reyes in the office who prescribed additional antibiotics which again provided minimal relief of symptoms.  Patient reported to the ER secondary to increased pain and increased streaking extending down into the leg.  Patient localizes pain to the right knee joint and extends down the anterior aspect of the lower leg into the ankle.  Patient states that his pain comes worse with attempted range of motion of the knee and attempted weightbearing.  Patient states he is unable to weight-bear on the affected extremity.  Patient denies any numbness or tingling in the leg.  Patient states that he does have a history of gout affecting his left knee but states that this feels different.  Patient does admit to having a documented fever of greater than 100.4 last night.  Patient denies any shortness of breath chest tightness chest pain.  Patient denies any nausea vomiting diarrhea.  Patient offers no other complaints at this time.    Patient had aspiration performed in the ED     Review Of Systems:   Skin: swelling and erythema right knee   Neuro: See HPI  Musculoskeletal: See HPI  14 point review of systems negative except as stated above     Past Medical History:   History reviewed. No pertinent past medical history.    Past Surgical History:   History reviewed. No  pertinent surgical history.    Family History:  Family history reviewed and non-contributory  History reviewed. No pertinent family history.    Social History:  Social History     Socioeconomic History    Marital status: /Civil Union     Spouse name: None    Number of children: None    Years of education: None    Highest education level: None   Occupational History    None   Tobacco Use    Smoking status: Never    Smokeless tobacco: Never   Vaping Use    Vaping status: Never Used   Substance and Sexual Activity    Alcohol use: Not Currently    Drug use: Never    Sexual activity: None   Other Topics Concern    None   Social History Narrative    None     Social Determinants of Health     Financial Resource Strain: Not on file   Food Insecurity: Not on file   Transportation Needs: Not on file   Physical Activity: Not on file   Stress: Not on file   Social Connections: Not on file   Intimate Partner Violence: Not on file   Housing Stability: Not on file       Allergies:   Allergies   Allergen Reactions    Penicillins Angioedema           Labs:  0   Lab Value Date/Time    HCT 50.2 (H) 09/02/2024 1146    HCT 46.1 08/30/2024 1206    HCT 48.7 09/14/2023 1749    HGB 16.5 09/02/2024 1146    HGB 15.1 08/30/2024 1206    HGB 16.6 09/14/2023 1749    WBC 12.19 (H) 09/02/2024 1146    WBC 12.59 (H) 08/30/2024 1206    WBC 13.22 (H) 09/14/2023 1749    ESR 21 (H) 09/02/2024 1146    CRP 20.1 (H) 09/02/2024 1146       Meds:    Current Facility-Administered Medications:     acetaminophen (TYLENOL) tablet 650 mg, 650 mg, Oral, Q6H PRN, Eleuterio Fu MD    aluminum-magnesium hydroxide-simethicone (MAALOX) oral suspension 30 mL, 30 mL, Oral, Q6H PRN, Eleuterio Fu MD    clindamycin in dextrose 5% (Cleocin) IVPB (premix) 600 mg 50 mL, 600 mg, Intravenous, Once, Eleuterio Fu MD, Last Rate: 100 mL/hr at 09/02/24 1723, 600 mg at 09/02/24 1723    Diclofenac Sodium (VOLTAREN) 1 % topical gel 2 g, 2 g, Topical, 4x  "Daily, Eleuterio Fu MD    magnesium hydroxide (MILK OF MAGNESIA) oral suspension 30 mL, 30 mL, Oral, Daily PRN, Eleuterio Fu MD    ondansetron (ZOFRAN) injection 4 mg, 4 mg, Intravenous, Q6H PRN, Eleuterio Fu MD    oxyCODONE (ROXICODONE) IR tablet 5 mg, 5 mg, Oral, Q6H PRN, Eleuterio Fu MD, 5 mg at 09/02/24 1723    Blood Culture:   No results found for: \"BLOODCX\"    Wound Culture:   No results found for: \"WOUNDCULT\"    Ins and Outs:  No intake/output data recorded.          Physical Exam:   /85   Pulse 60   Temp 98.7 °F (37.1 °C)   Resp 18   SpO2 94%   Gen: No acute distress, resting comfortably in bed  HEENT: Eyes clear, moist mucus membranes, hearing intact  Respiratory: No audible wheezing or stridor  Cardiovascular: Well Perfused peripherally, 2+ distal pulse  Abdomen: nondistended, no peritoneal signs    Musculoskeletal: right lower extremity  Patient resting comfortably in hospital bed in no apparent distress  Skin : Small sized joint effusion present, mild erythema appreciated throughout the medial aspect of the knee.  Small puncture wound present over the anterior medial joint.   TTP : Tenderness palpation noted over the medial and lateral joint line, patellar tendon region.  No other bony or soft tissue tenderness to palpation noted at this time.  Active range of motion of the knee : 0-20 degrees limited by severe pain.  Passive range of motion to 30 degrees of flexion limited by pain  SILT s/s/sp/dp/t.   Motor intact 5/5 strength with ankle dorsi/plantar flexion, EHL/FHL  2+ DP/PT pulse  Musculature is soft and compressible, no pain with passive stretch  Leg lengths equal    Tertiary: no tenderness over all other joints/long bones as except already stated.      Radiology:   I personally reviewed the films.  Right knee x-ray 4 views:  There is mild to moderate medial compartment joint space narrowing. There is enthesopathy along the superior pole of the patella. "     _*_*_*_*_*_*_*_*_*_*_*_*_*_*_*_*_*_*_*_*_*_*_*_*_*_*_*_*_*_*_*_*_*_*_*_*_*_*_*_*_*    Assessment:  58 y.o.male with a right knee effusion, severe pain.  Cannot rule out septic knee joint at this time,   hx of gout       Plan:   WBAT RLE  Ice  /  elevation recommended  Further recommendations pending aspirate results   PT/OT for ambulation assistance   Pain control per primary team  DVT ppx per primary team  Dispo : See above for additional recommendations.  Further recommendations pending aspirate results          Joe Bear PA-C              This consult was completed with the senior resident and attending on call.

## 2024-09-02 NOTE — ED PROVIDER NOTES
History  Chief Complaint   Patient presents with    Leg Pain     Right knee pain going down right shin and into right foot for two weeks, seen here previously for same. Patient reports initially red streaking was present and is still on antibiotics currently. Patient reports no improvement to pain, and is unable to bear weight on affected leg.      HPI    Patient is a 58-year-old male present emerged department for increasing right knee pain.  Patient has had multiple ER visits and an Ortho visit.  Orthopedics started him on an antibiotic and treatment for cellulitis.  Patient said he did not have much help with this.  Patient was seen again and switched to Keflex.  Patient reports persistence of red streak.  Patient says for the last 2 weeks or so he has been unable to ambulate.  He has been hopping around and using a cane.  Patient reports a fever of 102 last evening.  Patient is afebrile here.  Denies any generalized weakness, URI, change in bowel bladder habits.  Reports anterior knee pain with radiation of discomfort down the leg.  Patient reports taking his prednisone and colchicine.  History includes gout and sleep apnea.    Prior to Admission Medications   Prescriptions Last Dose Informant Patient Reported? Taking?   Diclofenac Sodium (VOLTAREN) 1 %   No No   Sig: Apply 2 g topically 4 (four) times a day   cephalexin (KEFLEX) 500 mg capsule   No No   Sig: Take 1 capsule (500 mg total) by mouth every 6 (six) hours for 7 days   oxyCODONE (Roxicodone) 5 immediate release tablet   No No   Sig: Take 1 tablet (5 mg total) by mouth every 6 (six) hours as needed for moderate pain for up to 10 doses Max Daily Amount: 20 mg   sulfamethoxazole-trimethoprim (BACTRIM DS) 800-160 mg per tablet   No No   Sig: Take 1 tablet by mouth every 12 (twelve) hours for 7 days      Facility-Administered Medications: None       History reviewed. No pertinent past medical history.    History reviewed. No pertinent surgical  history.    History reviewed. No pertinent family history.  I have reviewed and agree with the history as documented.    E-Cigarette/Vaping    E-Cigarette Use Never User      E-Cigarette/Vaping Substances     Social History     Tobacco Use    Smoking status: Never    Smokeless tobacco: Never   Vaping Use    Vaping status: Never Used   Substance Use Topics    Drug use: Never       Review of Systems   Constitutional:  Negative for chills and fever.   HENT:  Negative for ear pain and sore throat.    Eyes:  Negative for pain and visual disturbance.   Respiratory:  Negative for cough and shortness of breath.    Cardiovascular:  Negative for chest pain and palpitations.   Gastrointestinal:  Negative for abdominal pain and vomiting.   Genitourinary:  Negative for dysuria and hematuria.   Musculoskeletal:  Negative for arthralgias and back pain.        Right knee pain   Skin:  Negative for color change and rash.   Neurological:  Negative for seizures and syncope.   All other systems reviewed and are negative.      Physical Exam  Physical Exam  Vitals and nursing note reviewed.   Constitutional:       General: He is not in acute distress.     Appearance: He is well-developed.   HENT:      Head: Normocephalic and atraumatic.   Eyes:      Conjunctiva/sclera: Conjunctivae normal.   Cardiovascular:      Rate and Rhythm: Normal rate and regular rhythm.      Heart sounds: No murmur heard.  Pulmonary:      Effort: Pulmonary effort is normal. No respiratory distress.      Breath sounds: Normal breath sounds.   Abdominal:      Palpations: Abdomen is soft.      Tenderness: There is no abdominal tenderness.   Musculoskeletal:         General: No swelling.      Cervical back: Neck supple.      Comments: Strength, sensation and range of motion intact in bilateral lower extremities.  +2 DTRs of patella and Achilles tendons bilaterally. No perianal numbness, urinary retention or loss of bowel. Right knee increased warmth     Skin:      General: Skin is warm and dry.      Capillary Refill: Capillary refill takes less than 2 seconds.      Comments: Right localized erythema inferior to patella and red streak along right shin   Neurological:      General: No focal deficit present.      Mental Status: He is alert and oriented to person, place, and time.   Psychiatric:         Mood and Affect: Mood normal.         Vital Signs  ED Triage Vitals [09/02/24 1013]   Temperature Pulse Respirations Blood Pressure SpO2   98.7 °F (37.1 °C) 73 17 139/89 98 %      Temp Source Heart Rate Source Patient Position - Orthostatic VS BP Location FiO2 (%)   Oral Monitor Sitting Left arm --      Pain Score       --           Vitals:    09/02/24 1013   BP: 139/89   Pulse: 73   Patient Position - Orthostatic VS: Sitting         Visual Acuity      ED Medications  Medications   ondansetron (ZOFRAN) injection 4 mg (has no administration in time range)   morphine injection 4 mg (has no administration in time range)       Diagnostic Studies  Results Reviewed       Procedure Component Value Units Date/Time    CBC and differential [[id number]]     Lab Status: No result Specimen: Blood     Comprehensive metabolic panel [[id number]]     Lab Status: No result Specimen: Blood     Lyme Total AB W Reflex to IGM/IGG [[id number]]     Lab Status: No result Specimen: Blood     Narrative:      The following orders were created for panel order Lyme Total AB W Reflex to IGM/IGG.  Procedure                               Abnormality         Status                     ---------                               -----------         ------                     Lyme Total AB W Reflex t...[[id number]]                                                   Please view results for these tests on the individual orders.    Sedimentation rate, automated [[id number]]     Lab Status: No result Specimen: Blood     C-reactive protein [[id number]]     Lab Status: No result Specimen: Blood     Procalcitonin [[id number]]     Lab  Status: No result Specimen: Blood     Lactic acid, plasma (w/reflex if result > 2.0) [[id number]]     Lab Status: No result Specimen: Blood     Blood culture #1 [[id number]]     Lab Status: No result Specimen: Blood     Blood culture #2 [[id number]]     Lab Status: No result Specimen: Blood     Lyme Total AB W Reflex to IGM/IGG [[id number]]     Lab Status: No result Specimen: Blood                    VAS lower limb venous duplex study, unilateral/limited    (Results Pending)              Procedures  Procedures         ED Course                                               Medical Decision Making  Amount and/or Complexity of Data Reviewed  Labs: ordered.    Risk  Prescription drug management.                 Disposition  Final diagnoses:   None     ED Disposition       None          Follow-up Information    None         Patient's Medications   Discharge Prescriptions    No medications on file       No discharge procedures on file.    PDMP Review       None            ED Provider  Electronically Signed by

## 2024-09-02 NOTE — H&P
[facility]  H&P  Name: Silas Lr 58 y.o. male I MRN: [id number]  Unit/Bed#: -01 I Date of Admission: 9/2/2024   Date of Service: 9/2/2024 I Hospital Day: 0      Assessment & Plan   * Pain and swelling of right knee  Assessment & Plan  Patient presents with 2-week duration of  right knee effusion, severe pain.  Cannot rule out septic knee joint at this time,   hx of gout.  Today the patient complains of inability to bear weight on the right knee and severe pain even with minimal movement  Imaging so far of the knee has been inconclusive  Right knee joint aspiration and MRI of right knee to be done  Orthopedics consulted and their input appreciated    Ambulatory dysfunction  Assessment & Plan  Secondary to the right knee pain and swelling  Will get physical and Occupational Therapy         Very low concern for septic arthritis but will give the patient clindamycin 600 mg IV every 8 hours especially given that he failed 2 courses of outpatient antibiotics with Keflex and Bactrim and is allergic to penicillin    VTE Prophylaxis: Enoxaparin (Lovenox)  Code Status: Level 1 - Full Code  Anticipated Length of Stay:  Patient will be admitted on an Observation basis with an anticipated length of stay of less than 2 midnights.     Justification for Hospital Stay: Pain and swelling of right knee    Chief Complaint:     Chief Complaint   Patient presents with    Leg Pain     Right knee pain going down right shin and into right foot for two weeks, seen here previously for same. Patient reports initially red streaking was present and is still on antibiotics currently. Patient reports no improvement to pain, and is unable to bear weight on affected leg.      History of Present Illness:    Silas Lr is a 58 y.o. male who presents with pain and swelling in the right knee joint.  Patient has a past medical history of gout.  Patient states that this has been going on for  the last 2 weeks.  Patient does not complain of any injury to the site of his right knee.  Patient states that the intensity is significant and today on the day of admission he could not even put weight and weight-bear on the right knee.  Patient had multiple visits to the emergency room and was prescribed multiple courses of antibiotics and oral steroids.  Patient reports very minimal relief of symptoms.  Patient visited orthopedics office with Dr. Reyes and he had also prescribed antibiotics with the patient did not improve.  Patient states that the pain is in the knee but it is radiating to the leg.  Patient also knows that there is a small mass and radiation is in front of the knee.    Patient did complain of 1 episode of fever of 100.4 °F last night but subsequently denies any fever.  No dysuria or diarrhea.  No nausea or vomiting.  Patient does not complain of any neurological deficits in the form of any tingling or numbness in the legs.  Subsequent to that episode of fever patient has no more fevers.  Patient did have an aspiration performed in the emergency room of the right knee    Review of Systems:  Negative except as above  History obtained from the patient  General ROS: negative  Psychological ROS: negative  Ophthalmic ROS: negative  Respiratory ROS: no cough, shortness of breath, or wheezing  Cardiovascular ROS: no chest pain or dyspnea on exertion  Gastrointestinal ROS: no abdominal pain, change in bowel habits, or black or bloody stools  Genito-Urinary ROS: no dysuria, trouble voiding, or hematuria  Musculoskeletal ROS: positive for - joint swelling in right knee  Neurological ROS: no TIA or stroke symptoms  Hematological ROS- No active bleeding  Otherwise, all other 12 point review of systems normal.        Past Medical and Surgical History:   History reviewed. No pertinent past medical history.  History reviewed. No pertinent surgical history.  Meds/Allergies:  Allergies:   Allergies   Allergen  Reactions    Penicillins Angioedema     Prior to Admission Medications   Prescriptions Last Dose Informant Patient Reported? Taking?   Diclofenac Sodium (VOLTAREN) 1 % 9/1/2024  No Yes   Sig: Apply 2 g topically 4 (four) times a day   cephalexin (KEFLEX) 500 mg capsule 9/2/2024  No Yes   Sig: Take 1 capsule (500 mg total) by mouth every 6 (six) hours for 7 days   oxyCODONE (Roxicodone) 5 immediate release tablet 9/2/2024  No Yes   Sig: Take 1 tablet (5 mg total) by mouth every 6 (six) hours as needed for moderate pain for up to 10 doses Max Daily Amount: 20 mg   sulfamethoxazole-trimethoprim (BACTRIM DS) 800-160 mg per tablet Past Week  No Yes   Sig: Take 1 tablet by mouth every 12 (twelve) hours for 7 days      Facility-Administered Medications: None     Social History:     Social History     Socioeconomic History    Marital status: /Civil Union     Spouse name: Not on file    Number of children: Not on file    Years of education: Not on file    Highest education level: Not on file   Occupational History    Not on file   Tobacco Use    Smoking status: Never    Smokeless tobacco: Never   Vaping Use    Vaping status: Never Used   Substance and Sexual Activity    Alcohol use: Not Currently    Drug use: Never    Sexual activity: Not on file   Other Topics Concern    Not on file   Social History Narrative    Not on file     Social Determinants of Health     Financial Resource Strain: Not on file   Food Insecurity: No Food Insecurity (9/2/2024)    Hunger Vital Sign     Worried About Running Out of Food in the Last Year: Never true     Ran Out of Food in the Last Year: Never true   Transportation Needs: No Transportation Needs (9/2/2024)    PRAPARE - Transportation     Lack of Transportation (Medical): No     Lack of Transportation (Non-Medical): No   Physical Activity: Not on file   Stress: Not on file   Social Connections: Not on file   Intimate Partner Violence: Not on file   Housing Stability: Low Risk   (9/2/2024)    Housing Stability Vital Sign     Unable to Pay for Housing in the Last Year: No     Number of Times Moved in the Last Year: 0     Homeless in the Last Year: No     Patient Pre-hospital Living Situation: Lives at home  Patient Pre-hospital Level of Mobility: Fairly independent and mobile  Patient Pre-hospital Diet Restrictions: No restrictions    Family History:  History reviewed. No pertinent family history.  Physical Exam:   Vitals:   Blood Pressure: 143/85 (09/02/24 1717)  Pulse: 60 (09/02/24 1717)  Temperature: 98.7 °F (37.1 °C) (09/02/24 1717)  Temp Source: Oral (09/02/24 1013)  Respirations: 18 (09/02/24 1717)  SpO2: 94 % (09/02/24 1717)    General appearance: alert, fatigued, appears stated age, and cooperative  Constitutional- looks a little weak  HEENT - atraumatic and normocephalic  Neck- supple  Skin - no fresh rash  Extremities no fresh focal deformities, however patient does have a small swelling in front of the right knee which is quite tender  Cardiovascular- S1-S2 heard  Respiratory- bilateral air entry present, no crackles or rhonchi  Skin - no fresh rash  Abdomen - normal bowel sounds present, no rebound tenderness  CNS- No fresh focal deficits  Psych- no acute psychosis     Lab Results: I have personally reviewed pertinent reports.    Results from last 7 days   Lab Units 09/02/24  1146   WBC Thousand/uL 12.19*   HEMOGLOBIN g/dL 16.5   HEMATOCRIT % 50.2*   PLATELETS Thousands/uL 352   SEGS PCT % 72   LYMPHO PCT % 21   MONO PCT % 6   EOS PCT % 1     Results from last 7 days   Lab Units 09/02/24  1146 08/30/24  1206   SODIUM mmol/L 136 139   POTASSIUM mmol/L 4.8 4.3   CHLORIDE mmol/L 100 105   CO2 mmol/L 30 29   ANION GAP mmol/L 6 5   BUN mg/dL 13 13   CREATININE mg/dL 1.21 1.02   CALCIUM mg/dL 10.1 9.3   ALBUMIN g/dL 4.3 4.0   TOTAL BILIRUBIN mg/dL 0.60 0.35   ALK PHOS U/L 63 52   ALT U/L 28 26   AST U/L 21 22   EGFR ml/min/1.73sq m 64 79   GLUCOSE RANDOM mg/dL 90 100              Results from last 7 days   Lab Units 09/02/24  1146   LACTIC ACID mmol/L 1.0                            Imaging: I have personally reviewed pertinent reports.    No results found.    EKG, Pathology, and Other Studies Reviewed on Admission:   EKG  Result Date: 09/02/24  Personally reviewed strips with impression of: No acute ST or T wave changes seen    Epic Records Reviewed: Yes    Eleuterio Fu MD  [facility] Internal Medicine    ** Please Note: This note has been constructed using a voice recognition system. **

## 2024-09-02 NOTE — PLAN OF CARE
Problem: PAIN - ADULT  Goal: Verbalizes/displays adequate comfort level or baseline comfort level  Description: Interventions:  - Encourage patient to monitor pain and request assistance  - Assess pain using appropriate pain scale  - Administer analgesics based on type and severity of pain and evaluate response  - Implement non-pharmacological measures as appropriate and evaluate response  - Consider cultural and social influences on pain and pain management  - Notify physician/advanced practitioner if interventions unsuccessful or patient reports new pain  Outcome: Progressing     Problem: INFECTION - ADULT  Goal: Absence or prevention of progression during hospitalization  Description: INTERVENTIONS:  - Assess and monitor for signs and symptoms of infection  - Monitor lab/diagnostic results  - Monitor all insertion sites, i.e. indwelling lines, tubes, and drains  - Monitor endotracheal if appropriate and nasal secretions for changes in amount and color  - [place] appropriate cooling/warming therapies per order  - Administer medications as ordered  - Instruct and encourage patient and family to use good hand hygiene technique  - Identify and instruct in appropriate isolation precautions for identified infection/condition  Outcome: Progressing  Goal: Absence of fever/infection during neutropenic period  Description: INTERVENTIONS:  - Monitor WBC    Outcome: Progressing     Problem: SAFETY ADULT  Goal: Patient will remain free of falls  Description: INTERVENTIONS:  - Educate patient/family on patient safety including physical limitations  - Instruct patient to call for assistance with activity   - Consult OT/PT to assist with strengthening/mobility   - Keep Call bell within reach  - Keep bed low and locked with side rails adjusted as appropriate  - Keep care items and personal belongings within reach  - Initiate and maintain comfort rounds  - Make Fall Risk Sign visible to staff  - Offer Toileting every 3 Hours,  in advance of need  - Initiate/Maintain bed alarm  - Obtain necessary fall risk management equipment:   - Apply yellow socks and bracelet for high fall risk patients  - Consider moving patient to room near nurses station  Outcome: Progressing  Goal: Maintain or return to baseline ADL function  Description: INTERVENTIONS:  -  Assess patient's ability to carry out ADLs; assess patient's baseline for ADL function and identify physical deficits which impact ability to perform ADLs (bathing, care of mouth/teeth, toileting, grooming, dressing, etc.)  - Assess/evaluate cause of self-care deficits   - Assess range of motion  - Assess patient's mobility; develop plan if impaired  - Assess patient's need for assistive devices and provide as appropriate  - Encourage maximum independence but intervene and supervise when necessary  - Involve family in performance of ADLs  - Assess for home care needs following discharge   - Consider OT consult to assist with ADL evaluation and planning for discharge  - Provide patient education as appropriate  Outcome: Progressing  Goal: Maintains/Returns to pre admission functional level  Description: INTERVENTIONS:  - Perform AM-PAC 6 Click Basic Mobility/ Daily Activity assessment daily.  - Set and communicate daily mobility goal to care team and patient/family/caregiver.   - Collaborate with rehabilitation services on mobility goals if consulted  - Perform Range of Motion 3 times a day.  - Reposition patient every 3 hours.  - Dangle patient 3 times a day  - Stand patient 3 times a day  - Ambulate patient 3 times a day  - Out of bed to chair 3 times a day   - Out of bed for meals 3 times a day  - Out of bed for toileting  - Record patient progress and toleration of activity level   Outcome: Progressing     Problem: DISCHARGE PLANNING  Goal: Discharge to home or other facility with appropriate resources  Description: INTERVENTIONS:  - Identify barriers to discharge w/patient and caregiver  -  Arrange for needed discharge resources and transportation as appropriate  - Identify discharge learning needs (meds, wound care, etc.)  - Arrange for interpretive services to assist at discharge as needed  - Refer to Case Management Department for coordinating discharge planning if the patient needs post-hospital services based on physician/advanced practitioner order or complex needs related to functional status, cognitive ability, or social support system  Outcome: Progressing     Problem: Knowledge Deficit  Goal: Patient/family/caregiver demonstrates understanding of disease process, treatment plan, medications, and discharge instructions  Description: Complete learning assessment and assess knowledge base.  Interventions:  - Provide teaching at level of understanding  - Provide teaching via preferred learning methods  Outcome: Progressing

## 2024-09-03 LAB
ALBUMIN SERPL BCG-MCNC: 3.9 G/DL (ref 3.5–5)
ALP SERPL-CCNC: 57 U/L (ref 34–104)
ALT SERPL W P-5'-P-CCNC: 20 U/L (ref 7–52)
ANION GAP SERPL CALCULATED.3IONS-SCNC: 8 MMOL/L (ref 4–13)
AST SERPL W P-5'-P-CCNC: 17 U/L (ref 13–39)
B BURGDOR IGG+IGM SER QL IA: NEGATIVE
BASOPHILS # BLD AUTO: 0.03 THOUSANDS/ÂΜL (ref 0–0.1)
BASOPHILS NFR BLD AUTO: 0 % (ref 0–1)
BILIRUB SERPL-MCNC: 0.58 MG/DL (ref 0.2–1)
BUN SERPL-MCNC: 13 MG/DL (ref 5–25)
CALCIUM SERPL-MCNC: 9.6 MG/DL (ref 8.4–10.2)
CHLORIDE SERPL-SCNC: 101 MMOL/L (ref 96–108)
CO2 SERPL-SCNC: 26 MMOL/L (ref 21–32)
CREAT SERPL-MCNC: 1.07 MG/DL (ref 0.6–1.3)
EOSINOPHIL # BLD AUTO: 0.18 THOUSAND/ÂΜL (ref 0–0.61)
EOSINOPHIL NFR BLD AUTO: 2 % (ref 0–6)
ERYTHROCYTE [DISTWIDTH] IN BLOOD BY AUTOMATED COUNT: 12.5 % (ref 11.6–15.1)
GFR SERPL CREATININE-BSD FRML MDRD: 75 ML/MIN/1.73SQ M
GLUCOSE SERPL-MCNC: 102 MG/DL (ref 65–140)
HCT VFR BLD AUTO: 45.2 % (ref 36.5–49.3)
HGB BLD-MCNC: 15.2 G/DL (ref 12–17)
IMM GRANULOCYTES # BLD AUTO: 0.03 THOUSAND/UL (ref 0–0.2)
IMM GRANULOCYTES NFR BLD AUTO: 0 % (ref 0–2)
LYMPHOCYTES # BLD AUTO: 2.69 THOUSANDS/ÂΜL (ref 0.6–4.47)
LYMPHOCYTES NFR BLD AUTO: 24 % (ref 14–44)
MCH RBC QN AUTO: 30.7 PG (ref 26.8–34.3)
MCHC RBC AUTO-ENTMCNC: 33.6 G/DL (ref 31.4–37.4)
MCV RBC AUTO: 91 FL (ref 82–98)
MONOCYTES # BLD AUTO: 0.75 THOUSAND/ÂΜL (ref 0.17–1.22)
MONOCYTES NFR BLD AUTO: 7 % (ref 4–12)
NEUTROPHILS # BLD AUTO: 7.58 THOUSANDS/ÂΜL (ref 1.85–7.62)
NEUTS SEG NFR BLD AUTO: 67 % (ref 43–75)
NRBC BLD AUTO-RTO: 0 /100 WBCS
PLATELET # BLD AUTO: 297 THOUSANDS/UL (ref 149–390)
PMV BLD AUTO: 8.7 FL (ref 8.9–12.7)
POTASSIUM SERPL-SCNC: 4.5 MMOL/L (ref 3.5–5.3)
PROT SERPL-MCNC: 7.6 G/DL (ref 6.4–8.4)
RBC # BLD AUTO: 4.95 MILLION/UL (ref 3.88–5.62)
SODIUM SERPL-SCNC: 135 MMOL/L (ref 135–147)
WBC # BLD AUTO: 11.26 THOUSAND/UL (ref 4.31–10.16)

## 2024-09-03 PROCEDURE — 80053 COMPREHEN METABOLIC PANEL: CPT | Performed by: INTERNAL MEDICINE

## 2024-09-03 PROCEDURE — 97110 THERAPEUTIC EXERCISES: CPT

## 2024-09-03 PROCEDURE — 97163 PT EVAL HIGH COMPLEX 45 MIN: CPT

## 2024-09-03 PROCEDURE — 97165 OT EVAL LOW COMPLEX 30 MIN: CPT

## 2024-09-03 PROCEDURE — 99232 SBSQ HOSP IP/OBS MODERATE 35: CPT

## 2024-09-03 PROCEDURE — 99232 SBSQ HOSP IP/OBS MODERATE 35: CPT | Performed by: PHYSICIAN ASSISTANT

## 2024-09-03 PROCEDURE — 85025 COMPLETE CBC W/AUTO DIFF WBC: CPT | Performed by: INTERNAL MEDICINE

## 2024-09-03 RX ORDER — KETOROLAC TROMETHAMINE 30 MG/ML
15 INJECTION, SOLUTION INTRAMUSCULAR; INTRAVENOUS ONCE
Status: COMPLETED | OUTPATIENT
Start: 2024-09-03 | End: 2024-09-04

## 2024-09-03 RX ORDER — HYDROCODONE BITARTRATE AND ACETAMINOPHEN 5; 325 MG/1; MG/1
1 TABLET ORAL EVERY 6 HOURS PRN
Status: DISCONTINUED | OUTPATIENT
Start: 2024-09-03 | End: 2024-09-05 | Stop reason: HOSPADM

## 2024-09-03 RX ADMIN — Medication 2 G: at 10:21

## 2024-09-03 RX ADMIN — ACETAMINOPHEN 650 MG: 325 TABLET, FILM COATED ORAL at 16:27

## 2024-09-03 RX ADMIN — CLINDAMYCIN PHOSPHATE 600 MG: 600 INJECTION, SOLUTION INTRAVENOUS at 17:43

## 2024-09-03 RX ADMIN — ACETAMINOPHEN 650 MG: 325 TABLET, FILM COATED ORAL at 06:08

## 2024-09-03 RX ADMIN — Medication 2 G: at 22:00

## 2024-09-03 RX ADMIN — ENOXAPARIN SODIUM 40 MG: 40 INJECTION SUBCUTANEOUS at 09:38

## 2024-09-03 RX ADMIN — Medication 2 G: at 17:43

## 2024-09-03 RX ADMIN — CLINDAMYCIN PHOSPHATE 600 MG: 600 INJECTION, SOLUTION INTRAVENOUS at 09:37

## 2024-09-03 RX ADMIN — CLINDAMYCIN PHOSPHATE 600 MG: 600 INJECTION, SOLUTION INTRAVENOUS at 01:16

## 2024-09-03 NOTE — PLAN OF CARE
Problem: PHYSICAL THERAPY ADULT  Goal: Performs mobility at highest level of function for planned discharge setting.  See evaluation for individualized goals.  Description: Treatment/Interventions: Functional transfer training, LE strengthening/ROM, Elevations, Therapeutic exercise, Endurance training, Patient/family training, Equipment eval/education, Bed mobility, Gait training, Continued evaluation, OT  Equipment Recommended: Walker       See flowsheet documentation for full assessment, interventions and recommendations.  Note: Prognosis: Good  Problem List: Decreased strength, Decreased endurance, Decreased range of motion, Impaired balance, Decreased mobility, Pain, Orthopedic restrictions  Assessment: Silas Lr is a 58 y.o. male admitted to [facility] on 9/2/2024 for Pain and swelling of right knee, ambulatory dysfunction. Pt  has no past medical history on file.. Order placed for PT eval and tx. PT was consulted and pt was seen on 9/3/2024 for mobility assessment and d/c planning. Chart review and two person identifiers were completed.   Currently pt presents with decreased strength , decreased ROM, decreased static sitting balance , decreased dynamic sitting balance , decreased static standing balance, decreased dynamic standing balance , decreased gait speed, decreased step length , and decreased muscular endurance . Due to these impairments, they will require assistance to perform bed mobility, sit to stand , ambulation, stair negotiation, and transfers. Pt is currently functioning at a supervision assistance x1 level for bed mobility, contact guard assistance x1 level for transfers, contact guard assistance x1 level for ambulation with Rolling Walker. These activity limitations significantly impact their ability to participate in previous home and community roles and responsibilities , bathroom hygiene , and ambulation in home. The patient's AM-PAC Basic Mobility Inpatient Short Form Raw Score  is 17. PT recommends level III minimum resource intensity. They will benefit from skilled therapy to to reduce the risk of falls, to allow for safe ambulation, and to maximize functional potential.  Barriers to Discharge: Inaccessible home environment, Decreased caregiver support     Rehab Resource Intensity Level, PT: III (Minimum Resource Intensity)    See flowsheet documentation for full assessment.

## 2024-09-03 NOTE — PROGRESS NOTES
[facility]  Progress Note  Name: Silas Lr I  MRN: [id number]  Unit/Bed#: -01 I Date of Admission: 9/2/2024   Date of Service: 9/3/2024 I Hospital Day: 0    Assessment & Plan   * Pain and swelling of right knee  Assessment & Plan  Patient still complaining of pain and inability to bear weight.  Imaging so far of the knee has been inconclusive  Very low concern for septic arthritis but will give the patient clindamycin 600 mg IV every 8 hours especially given that he failed 2 courses of outpatient antibiotics with Keflex and Bactrim and is allergic to penicillin  Right knee joint aspiration: monosodium urate crystals  MRI Ordered, pending  Orthopedics consulted and their input appreciated  Patient reports not wanting to take oxycodone, makes him nauseous and doesn't like the way it makes him feel.  Tylenol having little effect.  Toradol for pain management    Ambulatory dysfunction  Assessment & Plan  Secondary to the right knee pain and swelling  OT/PT: Level III  Continue to encourage appropriate mobility               VTE Pharmacologic Prophylaxis: VTE Score: 2 Low Risk (Score 0-2) - Encourage Ambulation. Lovenox also ordered    Mobility:   Basic Mobility Inpatient Raw Score: 17  JH-HLM Goal: 5: Stand one or more mins  JH-HLM Achieved: 7: Walk 25 feet or more  JH-HLM Goal achieved. Continue to encourage appropriate mobility.      Education and Discussions with Family / Patient: Patient declined call to .     Total Time Spent on Date of Encounter in care of patient: 45 mins. This time was spent on one or more of the following: performing physical exam; counseling and coordination of care; obtaining or reviewing history; documenting in the medical record; reviewing/ordering tests, medications or procedures; communicating with other healthcare professionals and discussing with patient's family/caregivers.    Current Length of Stay: 0 day(s)  Current  Patient Status: Observation   Certification Statement: The patient will continue to require additional inpatient hospital stay due to awaiting for orthopedic evaluation, MRI of knee    Code Status: Level 1 - Full Code    Subjective:   Silas Awan is a 57 yo male who was admitted for pain and swelling of his right knee.  Patient was found in hospital bed in no acute distress.  He states that he is still having pain with little resolution.  Reports that he has had gout attacks in the past but that they came on suddenly and this has been a more gradual onset.      Objective:     Vitals:   Temp (24hrs), Av.9 °F (37.2 °C), Min:98.1 °F (36.7 °C), Max:99.8 °F (37.7 °C)    Temp:  [98.1 °F (36.7 °C)-99.8 °F (37.7 °C)] 99.8 °F (37.7 °C)  HR:  [59-77] 77  Resp:  [18] 18  BP: (124-143)/(81-85) 126/84  SpO2:  [94 %-95 %] 94 %  There is no height or weight on file to calculate BMI.     Input and Output Summary (last 24 hours):     Intake/Output Summary (Last 24 hours) at 9/3/2024 1601  Last data filed at 9/3/2024 0820  Gross per 24 hour   Intake 490 ml   Output --   Net 490 ml       Physical Exam:   Physical Exam  Constitutional:       Appearance: He is not ill-appearing or toxic-appearing.   HENT:      Head: Normocephalic and atraumatic.      Mouth/Throat:      Mouth: Mucous membranes are moist.   Eyes:      Pupils: Pupils are equal, round, and reactive to light.   Cardiovascular:      Rate and Rhythm: Normal rate and regular rhythm.      Heart sounds: Normal heart sounds. No murmur heard.  Pulmonary:      Effort: Pulmonary effort is normal.      Breath sounds: Normal breath sounds.   Abdominal:      General: Bowel sounds are normal. There is no distension.      Palpations: Abdomen is soft.      Tenderness: There is no abdominal tenderness. There is no guarding.   Musculoskeletal:         General: Swelling (Lateral to R Patella) and tenderness (Lateral to R patella) present.      Cervical back: Neck supple.      Comments:  Reduced ROM, active and passive, due to pain   Skin:     General: Skin is warm and dry.      Findings: Erythema (R knee) present.   Neurological:      General: No focal deficit present.      Mental Status: He is alert and oriented to person, place, and time.   Psychiatric:         Mood and Affect: Mood normal.          Additional Data:     Labs:  Results from last 7 days   Lab Units 09/03/24  0601   WBC Thousand/uL 11.26*   HEMOGLOBIN g/dL 15.2   HEMATOCRIT % 45.2   PLATELETS Thousands/uL 297   SEGS PCT % 67   LYMPHO PCT % 24   MONO PCT % 7   EOS PCT % 2     Results from last 7 days   Lab Units 09/03/24  0601   SODIUM mmol/L 135   POTASSIUM mmol/L 4.5   CHLORIDE mmol/L 101   CO2 mmol/L 26   BUN mg/dL 13   CREATININE mg/dL 1.07   ANION GAP mmol/L 8   CALCIUM mg/dL 9.6   ALBUMIN g/dL 3.9   TOTAL BILIRUBIN mg/dL 0.58   ALK PHOS U/L 57   ALT U/L 20   AST U/L 17   GLUCOSE RANDOM mg/dL 102                 Results from last 7 days   Lab Units 09/02/24  1146 08/30/24  1206   LACTIC ACID mmol/L 1.0  --    PROCALCITONIN ng/ml 0.06 <0.05       Lines/Drains:  Invasive Devices       Peripheral Intravenous Line  Duration             Peripheral IV 09/02/24 Right Antecubital 1 day                          Imaging: Reviewed radiology reports from this admission including: CT Lower Extremity and Personally reviewed the following imaging: CT Lower Extremity    Recent Cultures (last 7 days):   Results from last 7 days   Lab Units 09/02/24  1708 09/02/24  1146 09/02/24  1139   BLOOD CULTURE   --  Received in Microbiology Lab. Culture in Progress. Received in Microbiology Lab. Culture in Progress.   GRAM STAIN RESULT  3+ Polys  No No organisms seen  --   --        Last 24 Hours Medication List:   Current Facility-Administered Medications   Medication Dose Route Frequency Provider Last Rate    acetaminophen  650 mg Oral Q6H PRN Eleuterio Fu MD      aluminum-magnesium hydroxide-simethicone  30 mL Oral Q6H PRN Eleuterio  MD Nickie      clindamycin  600 mg Intravenous Q8H Eleuterio Fu MD Stopped (09/03/24 1026)    Diclofenac Sodium  2 g Topical 4x Daily Eleuterio Fu MD      enoxaparin  40 mg Subcutaneous Q24H DIANNE Eleuterio Fu MD      ketorolac  15 mg Intravenous Once Gus Moreno PA-C      magnesium hydroxide  30 mL Oral Daily PRN Eleuterio Fu MD      morphine injection  2 mg Intravenous Q4H PRN Eleuterio Fu MD      ondansetron  4 mg Intravenous Q6H PRN Eleuterio Fu MD      oxyCODONE  5 mg Oral Q6H PRN Eleuterio Fu MD          Today, Patient Was Seen By: uGs Moreno PA-C    **Please Note: This note may have been constructed using a voice recognition system.**

## 2024-09-03 NOTE — PLAN OF CARE
Problem: PAIN - ADULT  Goal: Verbalizes/displays adequate comfort level or baseline comfort level  Description: Interventions:  - Encourage patient to monitor pain and request assistance  - Assess pain using appropriate pain scale  - Administer analgesics based on type and severity of pain and evaluate response  - Implement non-pharmacological measures as appropriate and evaluate response  - Consider cultural and social influences on pain and pain management  - Notify physician/advanced practitioner if interventions unsuccessful or patient reports new pain  Outcome: Progressing     Problem: INFECTION - ADULT  Goal: Absence or prevention of progression during hospitalization  Description: INTERVENTIONS:  - Assess and monitor for signs and symptoms of infection  - Monitor lab/diagnostic results  - Monitor all insertion sites, i.e. indwelling lines, tubes, and drains  - Monitor endotracheal if appropriate and nasal secretions for changes in amount and color  - [place] appropriate cooling/warming therapies per order  - Administer medications as ordered  - Instruct and encourage patient and family to use good hand hygiene technique  - Identify and instruct in appropriate isolation precautions for identified infection/condition  Outcome: Progressing  Goal: Absence of fever/infection during neutropenic period  Description: INTERVENTIONS:  - Monitor WBC    Outcome: Progressing     Problem: SAFETY ADULT  Goal: Patient will remain free of falls  Description: INTERVENTIONS:  - Educate patient/family on patient safety including physical limitations  - Instruct patient to call for assistance with activity   - Consult OT/PT to assist with strengthening/mobility   - Keep Call bell within reach  - Keep bed low and locked with side rails adjusted as appropriate  - Keep care items and personal belongings within reach  - Initiate and maintain comfort rounds  - Make Fall Risk Sign visible to staff  - Offer Toileting every 2 Hours,  in advance of need  - Initiate/Maintain bed alarm  - Obtain necessary fall risk management equipment: call bell within reach   - Apply yellow socks and bracelet for high fall risk patients  - Consider moving patient to room near nurses station  Outcome: Progressing  Goal: Maintain or return to baseline ADL function  Description: INTERVENTIONS:  -  Assess patient's ability to carry out ADLs; assess patient's baseline for ADL function and identify physical deficits which impact ability to perform ADLs (bathing, care of mouth/teeth, toileting, grooming, dressing, etc.)  - Assess/evaluate cause of self-care deficits   - Assess range of motion  - Assess patient's mobility; develop plan if impaired  - Assess patient's need for assistive devices and provide as appropriate  - Encourage maximum independence but intervene and supervise when necessary  - Involve family in performance of ADLs  - Assess for home care needs following discharge   - Consider OT consult to assist with ADL evaluation and planning for discharge  - Provide patient education as appropriate  Outcome: Progressing  Goal: Maintains/Returns to pre admission functional level  Description: INTERVENTIONS:  - Perform AM-PAC 6 Click Basic Mobility/ Daily Activity assessment daily.  - Set and communicate daily mobility goal to care team and patient/family/caregiver.   - Collaborate with rehabilitation services on mobility goals if consulted  - Perform Range of Motion 4 times a day.  - Reposition patient every 2 hours.  - Dangle patient 3 times a day  - Stand patient 3 times a day  - Ambulate patient 3 times a day  - Out of bed to chair 3 times a day   - Out of bed for meals 3 times a day  - Out of bed for toileting  - Record patient progress and toleration of activity level   Outcome: Progressing     Problem: DISCHARGE PLANNING  Goal: Discharge to home or other facility with appropriate resources  Description: INTERVENTIONS:  - Identify barriers to discharge  w/patient and caregiver  - Arrange for needed discharge resources and transportation as appropriate  - Identify discharge learning needs (meds, wound care, etc.)  - Arrange for interpretive services to assist at discharge as needed  - Refer to Case Management Department for coordinating discharge planning if the patient needs post-hospital services based on physician/advanced practitioner order or complex needs related to functional status, cognitive ability, or social support system  Outcome: Progressing     Problem: Knowledge Deficit  Goal: Patient/family/caregiver demonstrates understanding of disease process, treatment plan, medications, and discharge instructions  Description: Complete learning assessment and assess knowledge base.  Interventions:  - Provide teaching at level of understanding  - Provide teaching via preferred learning methods  Outcome: Progressing

## 2024-09-03 NOTE — ASSESSMENT & PLAN NOTE
Secondary to the right knee pain and swelling  OT/PT: Level III  Continue to encourage appropriate mobility

## 2024-09-03 NOTE — PROGRESS NOTES
Progress Note - Orthopedics   Silas Lr 58 y.o. male MRN: [id number]  Unit/Bed#: -01      Subjective:    58 y.o.male currently admitted secondary to  Right knee pain.  Patient underwent an aspiration of the right knee yesterday which tested positive for gout.  Negative Gram stain appreciated.   2k WBC count synovial fluid.   Patient is doing approximately the same in comparison to previous examination.  Patient describes diffuse knee pain that becomes worse with attempted range of motion and attempted weightbearing.  Patient states that he is still unable to weight-bear on the leg.  Patient denies any fevers or chills.  Patient is scheduled to undergo an MRI of the right knee for further evaluation.  Patient offers no other complaints at this time.      Labs:  0   Lab Value Date/Time    HCT 45.2 09/03/2024 0601    HCT 50.2 (H) 09/02/2024 1146    HCT 46.1 08/30/2024 1206    HGB 15.2 09/03/2024 0601    HGB 16.5 09/02/2024 1146    HGB 15.1 08/30/2024 1206    WBC 11.26 (H) 09/03/2024 0601    WBC 12.19 (H) 09/02/2024 1146    WBC 12.59 (H) 08/30/2024 1206    ESR 21 (H) 09/02/2024 1146    CRP 20.1 (H) 09/02/2024 1146       Meds:    Current Facility-Administered Medications:     acetaminophen (TYLENOL) tablet 650 mg, 650 mg, Oral, Q6H PRN, Eleuterio Fu MD, 650 mg at 09/03/24 1627    aluminum-magnesium hydroxide-simethicone (MAALOX) oral suspension 30 mL, 30 mL, Oral, Q6H PRN, Eleuterio Fu MD    clindamycin in dextrose 5% (Cleocin) IVPB (premix) 600 mg 50 mL, 600 mg, Intravenous, Q8H, Eleuterio Fu MD, Last Rate: 100 mL/hr at 09/03/24 1743, 600 mg at 09/03/24 1743    Diclofenac Sodium (VOLTAREN) 1 % topical gel 2 g, 2 g, Topical, 4x Daily, Eleuterio Fu MD, 2 g at 09/03/24 1743    enoxaparin (LOVENOX) subcutaneous injection 40 mg, 40 mg, Subcutaneous, Q24H [facility], Eleuterio Fu MD, 40 mg at 09/03/24 0990    HYDROcodone-acetaminophen (NORCO) 5-325 mg per tablet 1  "tablet, 1 tablet, Oral, Q6H PRN, Eleuterio Fu MD    ketorolac (TORADOL) injection 15 mg, 15 mg, Intravenous, Once, Gus Moreno PA-C    magnesium hydroxide (MILK OF MAGNESIA) oral suspension 30 mL, 30 mL, Oral, Daily PRN, Eleuterio Fu MD    morphine injection 2 mg, 2 mg, Intravenous, Q4H PRN, Eleuterio Fu MD    ondansetron (ZOFRAN) injection 4 mg, 4 mg, Intravenous, Q6H PRN, Eleuterio Fu MD, 4 mg at 09/02/24 1909    Blood Culture:   Lab Results   Component Value Date    BLOODCX Received in Microbiology Lab. Culture in Progress. 09/02/2024       Wound Culture:   No results found for: \"WOUNDCULT\"    Ins and Outs:  I/O last 24 hours:  In: 240 [P.O.:240]  Out: -           Physical:  Vitals:    09/03/24 1539   BP: 126/84   Pulse: 77   Resp: 18   Temp: 99.8 °F (37.7 °C)   SpO2: 94%       Musculoskeletal: right lower extremity  Patient resting comfortably in hospital bed in no apparent distress  Skin : Small sized joint effusion present, mild erythema appreciated throughout the medial aspect of the knee.  Small puncture wound present over the anterior medial joint.   TTP : Tenderness palpation noted over the medial and lateral joint line, patellar tendon region.  No other bony or soft tissue tenderness to palpation noted at this time.  Active range of motion of the knee : 0-20 degrees limited by severe pain.  Passive range of motion to 30 degrees of flexion limited by pain  SILT s/s/sp/dp/t.   Motor intact 5/5 strength with ankle dorsi/plantar flexion, EHL/FHL  2+ DP/PT pulse  Musculature is soft and compressible, no pain with passive stretch  Leg lengths equal      Assessment:    58 y.o.male with right knee gout,  cellulitis leg.       Plan:  WBAT RLE  Ice  /  elevation recommended  PT/OT for ambulation assistance   Pain control per primary team  DVT ppx per primary team  Dispo : See above for additional recommendations.  Further recommendations pending MRI results right knee " "                    Joe Bear PA-C                                    Portions of the record may have been created with voice recognition software.  Occasional wrong word or \"sound a like\" substitutions may have occurred due to the inherent limitations of voice recognition software.  Read the chart carefully and recognize, using context, where substitutions have occurred.    "

## 2024-09-03 NOTE — CASE MANAGEMENT
Case Management Assessment & Discharge Planning Note    Patient name Silas Lr  Location /-01 MRN [id number]  : 1965 Date 9/3/2024       Current Admission Date: 2024  Current Admission Diagnosis:Pain and swelling of right knee   Patient Active Problem List    Diagnosis Date Noted Date Diagnosed    Pain and swelling of right knee 2024     Ambulatory dysfunction 2024       LOS (days): 0  Geometric Mean LOS (GMLOS) (days):   Days to GMLOS:     OBJECTIVE:              Current admission status: Inpatient       Preferred Pharmacy:   [id number] [place] - [address]  [address]  [place] [address]  Phone: [phone] Fax: [phone]    Primary Care Provider: No primary care provider on file.    Primary Insurance: MEDICARE  Secondary Insurance: INTER GROUP    ASSESSMENT:  Active Health Care Proxies       Vi [facility] Health Care Representative - Spouse   Primary Phone: [phone] (Mobile)                 Advance Directives  Does patient have a Health Care POA?: No  Was patient offered paperwork?: Yes (Patient considering)  Does patient currently have a Health Care decision maker?: Yes, please see Health Care Proxy section  Does patient have Advance Directives?: No  Was patient offered paperwork?: Yes (Patient considering)  Primary Contact: Gaby Ricci    Readmission Root Cause  30 Day Readmission: No    Patient Information  Admitted from:: Home  Mental Status: Alert  During Assessment patient was accompanied by: Not accompanied during assessment  Assessment information provided by:: Patient  Primary Caregiver: Self  Support Systems: Spouse/significant other  County of Residence: [place]  What city do you live in?: [place]  Home entry access options. Select all that apply.: Stairs  Number of steps to enter home.: 5  Do the steps have railings?: Yes  Type of Current Residence: 2 story home  Upon entering residence, is there  a bedroom on the main floor (no further steps)?: No  A bedroom is located on the following floor levels of residence (select all that apply):: 2nd Floor  Upon entering residence, is there a bathroom on the main floor (no further steps)?: Yes  Number of steps to 2nd floor from main floor: One Flight  Living Arrangements: Lives w/ Spouse/significant other  Is patient a ?: Yes  Is [facility] with [facility])?: No    Activities of Daily Living Prior to Admission  Functional Status: Independent  Completes ADLs independently?: Yes  Ambulates independently?: No  Level of ambulatory dependence: Assistance  Does patient use assisted devices?: Yes  Assisted Devices (DME) used: Walker  Does patient currently own DME?: No  Does patient have a history of Outpatient Therapy (PT/OT)?: No  Does the patient have a history of Short-Term Rehab?: Yes  Does patient have a history of HHC?: No  Does patient currently have HHC?: No    Patient Information Continued  Income Source: SSI/SSD  Does patient have prescription coverage?: Yes  Does patient receive dialysis treatments?: No  Does patient have a history of substance abuse?: No  Does patient have a history of Mental Health Diagnosis?: No    PHQ 2/9 Screening   Reviewed PHQ 2/9 Depression Screening Score?: No    Means of Transportation  Means of Transport to Appts:: Drives Self      Social Determinants of Health (SDOH)      Flowsheet Row Most Recent Value   Housing Stability    In the last 12 months, was there a time when you were not able to pay the mortgage or rent on time? N   In the past 12 months, how many times have you moved where you were living? 0   At any time in the past 12 months, were you homeless or living in a shelter (including now)? N   Transportation Needs    In the past 12 months, has lack of transportation kept you from medical appointments or from getting medications? no   In the past 12 months, has lack of transportation kept you from meetings,  work, or from getting things needed for daily living? No   Food Insecurity    Within the past 12 months, you worried that your food would run out before you got the money to buy more. Never true   Within the past 12 months, the food you bought just didn't last and you didn't have money to get more. Never true   Utilities    In the past 12 months has the electric, gas, oil, or water company threatened to shut off services in your home? No            DISCHARGE DETAILS:    Discharge planning discussed with:: Patient at bedside  Freedom of Choice: Yes  Comments - Freedom of Choice: CM discussed discharge planning and freedom of choice if services are recommended by [facility]. Patient considering STR vs HHC depending on Ortho recommendations.  CM contacted family/caregiver?: No- see comments (Spouse was not in room while CM was consulting with patient, but will return.)  Were Treatment Team discharge recommendations reviewed with patient/caregiver?: Yes  Did patient/caregiver verbalize understanding of patient care needs?: Yes  Were patient/caregiver advised of the risks associated with not following Treatment Team discharge recommendations?: Yes    Contacts  Patient Contacts: Gaby Lebron  Relationship to Patient:: Family    DME Referral Provided  Referral made for DME?: Yes  DME referral completed for the following items:: Walker  DME Supplier Name:: [employer]    Other Referral/Resources/Interventions Provided:  Interventions: HHC    Would you like to participate in our [facility] service program?  : No - Declined    Treatment Team Recommendation: Home with Home Health Care  Discharge Destination Plan:: Home with Home Health Care  Transport at Discharge : Family     [place] referral sent for HHC in [place]. Order placed for walker in [place].

## 2024-09-03 NOTE — PLAN OF CARE

## 2024-09-03 NOTE — OCCUPATIONAL THERAPY NOTE
Occupational Therapy Evaluation      Silas Lr    9/3/2024    Principal Problem:    Pain and swelling of right knee  Active Problems:    Ambulatory dysfunction      History reviewed. No pertinent past medical history.    History reviewed. No pertinent surgical history.    09/03/24 1026   OT Last Visit   OT Visit Date 09/03/24   Note Type   Note type Evaluation   Pain Assessment   Pain Assessment Tool 0-10   Pain Score 7   Pain Location/Orientation Location: Knee   Home Living   Type of Home House   Home Layout Two level;1/2 bath on main level;Bed/bath upstairs;Stairs to enter with rails  (4STE; full flight to bed/ bath)   Bathroom Shower/Tub Tub/shower unit   Bathroom Toilet Standard   Bathroom Equipment Hand-held shower   Bathroom Accessibility Accessible   Home Equipment Walker;Cane;Crutches   Additional Comments PTA pt reports no AD for mobility   Prior Function   Level of [place] Independent with ADLs;Independent with functional mobility;Independent with IADLS   Lives With Spouse  (2 labs)   Receives Help From Family  (recently moved here from [place] - no local support)   IADLs Independent with driving;Independent with meal prep;Independent with medication management  (cleaning and laundry with Ind)   Falls in the last 6 months 0   Vocational Retired  ()   Lifestyle   Autonomy Pt reports PLOF was Ind with ADLS, IADLS, and (+) driving   Reciprocal Relationships spouse, 2 kids, 1 grand   ADL   Eating Assistance 6  Modified independent   Eating Deficit Increased time to complete   Grooming Assistance 6  Modified Independent   Grooming Deficit Increased time to complete   UB Bathing Assistance 5  Supervision/Setup   UB Bathing Deficit Increased time to complete;Supervision/safety;Setup   LB Bathing Assistance 4  Minimal Assistance   LB Bathing Deficit Increased time to complete;Supervision/safety;Verbal cueing;Setup;Steadying   UB Dressing Assistance 5  Supervision/Setup   UB Dressing  Deficit Increased time to complete;Supervision/safety;Setup   LB Dressing Assistance 4  Minimal Assistance   LB Dressing Deficit Increased time to complete;Supervision/safety;Verbal cueing;Setup;Steadying   Toileting Assistance  5  Supervision/Setup   Toileting Deficit Increased time to complete;Supervison/safety;Setup   Functional Assistance 4  Minimal Assistance   Functional Deficit Increased time to complete;Supervision/safety;Verbal cueing;Setup;Steadying   Bed Mobility   Supine to Sit 5  Supervision   Additional items Assist x 1;Increased time required;Bedrails   Transfers   Sit to Stand 4  Minimal assistance (CGA)   Additional items Assist x 1;Bedrails;Increased time required;Verbal cues   Stand to Sit 4  Minimal assistance (CGA)   Additional items Assist x 1;Bedrails;Increased time required;Verbal cues   Balance   Static Sitting Normal   Dynamic Sitting Good   Static Standing Fair    Dynamic Standing Fair -   Activity Tolerance   Activity Tolerance Patient tolerated treatment well   Medical Staff Made Aware Co-evaluation performed with PT Jaleel secondary to complex medical condition of patient, possible A of 2 required to achieve and maintain transitional movements, and pt's regression of functional status from baseline. PT/OT goals were addressed separately.   RUE Assessment   RUE Assessment WFL   LUE Assessment   LUE Assessment WFL   Hand Function   Gross Motor Coordination Functional   Fine Motor Coordination Functional   Sensation   Light Touch No apparent deficits   Sharp/Dull No apparent deficits   Stereognosis No apparent deficits   Proprioception   Proprioception No apparent deficits   Vision-Basic Assessment   Current Vision No visual deficits   Vision - Complex Assessment   Ocular Range of Motion Intact   Psychosocial   Psychosocial (WDL) WDL   Perception   Inattention/Neglect Appears intact   Cognition   Overall Cognitive Status WFL   Arousal/Participation Alert;Cooperative   Attention Within  functional limits   Orientation Level Oriented X4   Memory Within functional limits   Following Commands Follows all commands and directions without difficulty   Comments Pt was agreeable to OT eval   Assessment   Limitation Decreased ADL status;Decreased UE strength;Decreased endurance;Decreased self-care trans;Decreased high-level ADLs   Prognosis Good   Assessment Pt is a 58 y.o. male seen for OT evaluation s/p admit to [facility] on 9/2/2024 w/ Pain and swelling of right knee. Comorbidities affecting pt's functional performance at time of assessment include: ambulatory dysfunction  . Orders placed for OT evaluation and treatment.  Performed at least two patient identifiers during session including name and wristband. Personal factors affecting pt at time of IE include:steps to enter environment, limited home support, difficulty performing ADLS, difficulty performing IADLS , health management , and environment. Prior to admission, pt reports Ind with ADLs, Ind with IADLs, and (+) driving.  Upon evaluation: Pt requires setup/S with UB ADLs, min A with LB ADLs, CGA/min A with xfers and CGA/min A with functional mobility 2* the following deficits impacting occupational performance: decreased dynamic sit/ stand balance, decreased activity tolerance, decreased standing tolerance time for self care and functional mobility, increased pain, orthopedic restrictions, environmental deficits, decreased mobilty, and requiring external assistance to complete transitional movements. Pt to benefit from continued skilled OT tx while in the hospital to address deficits as defined above and maximize level of functional independence w ADL's and functional mobility. Occupational Performance areas to address include: bathing/shower, toilet hygiene, dressing, health maintenance, functional mobility, community mobility, clothing management, cleaning, meal prep, money management, and household maintenance. From OT standpoint,  recommendation at time of d/c would be Level 3 (Min Resource Intensity).   Plan   Treatment Interventions ADL retraining;UE strengthening/ROM;Functional transfer training;Endurance training;Patient/family training;Equipment evaluation/education;Compensatory technique education;Continued evaluation;Cardiac education;Energy conservation;Activityengagement   Goal Expiration Date 09/16/24   OT Frequency 2-3x/wk   Discharge Recommendation   Rehab Resource Intensity Level, OT III (Minimum Resource Intensity)   AM-PAC Daily Activity Inpatient   Lower Body Dressing 3   Bathing 3   Toileting 3   Upper Body Dressing 4   Grooming 4   Eating 4   Daily Activity Raw Score 21   Daily Activity Standardized Score (Calc for Raw Score >=11) 44.27   AM-PAC Applied Cognition Inpatient   Following a Speech/Presentation 4   Understanding Ordinary Conversation 4   Taking Medications 4   Remembering Where Things Are Placed or Put Away 4   Remembering List of 4-5 Errands 4   Taking Care of Complicated Tasks 4   Applied Cognition Raw Score 24   Applied Cognition Standardized Score 62.21       Occupational therapy Goals: In 5-7 days    Patient will verbalize and demonstrate use of energy conservation/ deep breathing technique and work simplification skills during functional activity with no verbal cues.    Patient will verbalize and demonstrate good body mechanics and joint protection techniques during ADLs/ IADLs with no verbal cues     Patient will increase OOB/ sitting tolerance to 4-6 hours per day for increased participation in self care and leisure tasks with no s/s of exertion.    Patient will identify s/s of exertion during ADL and functional mobility with no verbal cues.     Patient will verbalize/ demonstrate compensatory strategies to recover from exertion with no verbal cues.     Patient will increase standing tolerance time to 13-15 minutes with No UE support to complete sink level ADLs at modified independent level.    Patient  will increase sitting tolerance at edge of bed to 30-45 minutes to complete UB ADLs at modified independent level.     Patient will demonstrate ability to safely perform LB ADLS with MI with long handled adaptive dressing equipment.    Patient/ Family will demonstrate competency with UE Home Exercise Program.       Dagn Astorga MS OTR/L

## 2024-09-03 NOTE — PHYSICAL THERAPY NOTE
Physical Therapy Evaluation    Patient's Name: Silas Lr    Admitting Diagnosis  Bursitis [M71.9]  Leg pain [M79.606]  Right leg pain [M79.604]    Problem List  Patient Active Problem List   Diagnosis    Pain and swelling of right knee    Ambulatory dysfunction       Past Medical History  History reviewed. No pertinent past medical history.    Past Surgical History  History reviewed. No pertinent surgical history.    Recent Imaging  VAS lower limb venous duplex study, unilateral/limited   Final Result by Luis Morgan MD (09/02 1455)      MRI inpatient order    (Results Pending)       Recent Vital Signs  Vitals:    09/02/24 1630 09/02/24 1717 09/02/24 2319 09/03/24 0735   BP: 137/81 143/85 124/81 136/81   BP Location:       Pulse: 64 60 59    Resp:  18  18   Temp:  98.7 °F (37.1 °C) 98.1 °F (36.7 °C)    TempSrc:       SpO2: 95% 94% 94%         09/03/24 1027   PT Last Visit   PT Visit Date 09/03/24   Note Type   Note type Evaluation   Pain Assessment   Pain Assessment Tool 0-10   Pain Score 7   Pain Location/Orientation Orientation: Right;Location: Knee   Pain Onset/Description Onset: Ongoing   Patient's Stated Pain Goal No pain   Hospital Pain Intervention(s) Repositioned;Ambulation/increased activity   Restrictions/Precautions   Weight Bearing Precautions Per Order Yes   RLE Weight Bearing Per Order WBAT   LLE Weight Bearing Per Order   (per ortho)   Braces or Orthoses Other (Comment)  (none reported)   Other Precautions Chair Alarm;Bed Alarm;WBS;Fall Risk;Pain   Home Living   Type of Home House   Home Layout Two level;1/2 bath on main level;Bed/bath upstairs;Stairs to enter with rails  (4 OBDULIA, FF to bed/bath)   Bathroom Shower/Tub Tub/shower unit   Bathroom Toilet Standard   Bathroom Equipment Hand-held shower   Bathroom Accessibility Accessible   Home Equipment Walker;Cane;Crutches   Additional Comments no AD at baseline   Prior Function   Level of [place] Independent with  ADLs;Independent with functional mobility;Independent with IADLS   Lives With Spouse   Receives Help From Family  (recently moved here from [place] - no local support)   IADLs Independent with driving;Independent with meal prep;Independent with medication management   Falls in the last 6 months 0   Vocational Retired  ()   General   Family/Caregiver Present No   Cognition   Overall Cognitive Status WFL   Arousal/Participation Alert   Orientation Level Oriented X4   Memory Within functional limits   Following Commands Follows all commands and directions without difficulty   Comments Pt agreeable to PT evaluation and session   RLE Assessment   RLE Assessment X  (grossly 3+/5 observed through functional mobility, decreased knee flexion and extension ROM)   LLE Assessment   LLE Assessment WFL  (5/5)   Vision-Basic Assessment   Current Vision No visual deficits   Coordination   Sensation WFL   Bed Mobility   Supine to Sit 5  Supervision   Additional items Assist x 1;Bedrails;Increased time required;Verbal cues   Transfers   Sit to Stand   (CGA)   Additional items Assist x 1;Increased time required;Verbal cues   Stand to Sit   (CGA)   Additional items Assist x 1;Armrests;Increased time required;Verbal cues   Additional Comments with RW   Ambulation/Elevation   Gait pattern Decreased heel strike;Decreased toe off;Decreased hip extension;Excessively slow;Step to;Short stride;Decreased R stance;Improper Weight shift   Gait Assistance   (CGA)   Additional items Assist x 1;Verbal cues   Assistive Device Rolling walker   Distance 100'   Balance   Static Sitting Fair +   Dynamic Sitting Fair   Static Standing Fair   Dynamic Standing Fair -   Ambulatory Fair -   Endurance Deficit   Endurance Deficit Yes   Activity Tolerance   Activity Tolerance Patient limited by pain   Medical Staff Made Aware JARRETT Leong  (Pt seen as co-evaluation with OT due to co-morbidities, clinically unstable presentation, and present impairments)    Assessment   Prognosis Good   Problem List Decreased strength;Decreased endurance;Decreased range of motion;Impaired balance;Decreased mobility;Pain;Orthopedic restrictions   Assessment Silas Lr is a 58 y.o. male admitted to [facility] on 9/2/2024 for Pain and swelling of right knee, ambulatory dysfunction. Pt  has no past medical history on file.. Order placed for PT eval and tx. PT was consulted and pt was seen on 9/3/2024 for mobility assessment and d/c planning. Chart review and two person identifiers were completed.   Currently pt presents with decreased strength , decreased ROM, decreased static sitting balance , decreased dynamic sitting balance , decreased static standing balance, decreased dynamic standing balance , decreased gait speed, decreased step length , and decreased muscular endurance . Due to these impairments, they will require assistance to perform bed mobility, sit to stand , ambulation, stair negotiation, and transfers. Pt is currently functioning at a supervision assistance x1 level for bed mobility, contact guard assistance x1 level for transfers, contact guard assistance x1 level for ambulation with Rolling Walker. These activity limitations significantly impact their ability to participate in previous home and community roles and responsibilities , bathroom hygiene , and ambulation in home. The patient's AM-PAC Basic Mobility Inpatient Short Form Raw Score is 17. PT recommends level III minimum resource intensity. They will benefit from skilled therapy to to reduce the risk of falls, to allow for safe ambulation, and to maximize functional potential.   Barriers to Discharge Inaccessible home environment;Decreased caregiver support   Goals   Patient Goals to have less pain and bend the knee   STG Expiration Date 09/13/24   Short Term Goal #1 Within 10 days patient will complete: 1) Bed mobility skills with modified independent assistance to facilitate safe return to previous  living environment 2) Functional transfers with modified independent assistance to facilitate safe return to previous living environment  3) Ambulation with least restrictive AD modified independent assistance without LOB and stable vitals for safe ambulation home/ community distances. 4) Stair training up/down flight 4 step/s with appropriate rail/s and modified independent assistance for safe access to previous living environment. 5) Improve balance grades to fair + to reduce risk of falls. 6)Improve LE strength grades by 1 to increase independence w/ transfers and gait.  7) PT for ongoing pt and family education; DME needs and D/C planning to promote highest level of function in least restrictive environment.   PT Treatment Day 1   Plan   Treatment/Interventions Functional transfer training;LE strengthening/ROM;Elevations;Therapeutic exercise;Endurance training;Patient/family training;Equipment eval/education;Bed mobility;Gait training;Continued evaluation;OT   PT Frequency 2-3x/wk   Discharge Recommendation   Rehab Resource Intensity Level, PT III (Minimum Resource Intensity)   Equipment Recommended Walker   AM-PAC Basic Mobility Inpatient   Turning in Flat Bed Without Bedrails 3   Lying on Back to Sitting on Edge of Flat Bed Without Bedrails 3   Moving Bed to Chair 3   Standing Up From Chair Using Arms 3   Walk in Room 3   Climb 3-5 Stairs With Railing 2   Basic Mobility Inpatient Raw Score 17   Basic Mobility Standardized Score 39.67   [facility] Highest Level Of Mobility   -HLM Goal 5: Stand one or more mins   -HLM Achieved 7: Walk 25 feet or more   Additional Treatment Session   Start Time 1040   End Time 1049   Treatment Assessment PT session focused on theraputic exercises for strengthening LE. Pt completed seated LE exercises well, pt demonstrated increased pain during heel slides and was only able to complete 20* of knee flexion. Pt ended session seated in recliner with alarm active and all needs  within reach.   Exercises   Quad Sets Sitting;20 reps;AROM;Bilateral   Heelslides Sitting;5 reps;AROM;Bilateral   Knee AROM Long Arc Quad Sitting;20 reps;AROM;Bilateral   Ankle Pumps Sitting;20 reps;AROM;Bilateral   End of Consult   Patient Position at End of Consult Bedside chair;Bed/Chair alarm activated;All needs within reach       Recommendations                                                                                                              Pt will benefit from continued skilled IP PT to address the above mentioned impairments in order to maximize recovery and increase functional independence when completing mobility and ADLs. See flow sheet for goals and POC.     PT Evaluation Time: 7670-9512  PT Treatment Time: 1167-0042    Jaleel Pierre, PT, DPT

## 2024-09-03 NOTE — PLAN OF CARE
Problem: OCCUPATIONAL THERAPY ADULT  Goal: Performs self-care activities at highest level of function for planned discharge setting.  See evaluation for individualized goals.  Description: Treatment Interventions: ADL retraining, UE strengthening/ROM, Functional transfer training, Endurance training, Patient/family training, Equipment evaluation/education, Compensatory technique education, Continued evaluation, Cardiac education, Energy conservation, Activityengagement          See flowsheet documentation for full assessment, interventions and recommendations.   Note: Limitation: Decreased ADL status, Decreased UE strength, Decreased endurance, Decreased self-care trans, Decreased high-level ADLs  Prognosis: Good  Assessment: Pt is a 58 y.o. male seen for OT evaluation s/p admit to [facility] on 9/2/2024 w/ Pain and swelling of right knee. Comorbidities affecting pt's functional performance at time of assessment include: ambulatory dysfunction  . Orders placed for OT evaluation and treatment.  Performed at least two patient identifiers during session including name and wristband. Personal factors affecting pt at time of IE include:steps to enter environment, limited home support, difficulty performing ADLS, difficulty performing IADLS , health management , and environment. Prior to admission, pt reports Ind with ADLs, Ind with IADLs, and (+) driving.  Upon evaluation: Pt requires setup/S with UB ADLs, min A with LB ADLs, CGA/min A with xfers and CGA/min A with functional mobility 2* the following deficits impacting occupational performance: decreased dynamic sit/ stand balance, decreased activity tolerance, decreased standing tolerance time for self care and functional mobility, increased pain, orthopedic restrictions, environmental deficits, decreased mobilty, and requiring external assistance to complete transitional movements. Pt to benefit from continued skilled OT tx while in the hospital to address  deficits as defined above and maximize level of functional independence w ADL's and functional mobility. Occupational Performance areas to address include: bathing/shower, toilet hygiene, dressing, health maintenance, functional mobility, community mobility, clothing management, cleaning, meal prep, money management, and household maintenance. From OT standpoint, recommendation at time of d/c would be Level 3 (Min Resource Intensity).     Rehab Resource Intensity Level, OT: III (Minimum Resource Intensity)

## 2024-09-04 LAB
ANION GAP SERPL CALCULATED.3IONS-SCNC: 7 MMOL/L (ref 4–13)
BUN SERPL-MCNC: 16 MG/DL (ref 5–25)
CALCIUM SERPL-MCNC: 9.7 MG/DL (ref 8.4–10.2)
CHLORIDE SERPL-SCNC: 101 MMOL/L (ref 96–108)
CO2 SERPL-SCNC: 27 MMOL/L (ref 21–32)
CREAT SERPL-MCNC: 1.1 MG/DL (ref 0.6–1.3)
CRP SERPL QL: 39.4 MG/L
DME PARACHUTE DELIVERY DATE ACTUAL: NORMAL
DME PARACHUTE DELIVERY DATE REQUESTED: NORMAL
DME PARACHUTE ITEM DESCRIPTION: NORMAL
DME PARACHUTE ORDER STATUS: NORMAL
DME PARACHUTE SUPPLIER NAME: NORMAL
DME PARACHUTE SUPPLIER PHONE: NORMAL
ERYTHROCYTE [DISTWIDTH] IN BLOOD BY AUTOMATED COUNT: 12.4 % (ref 11.6–15.1)
ERYTHROCYTE [SEDIMENTATION RATE] IN BLOOD: 52 MM/HOUR (ref 0–19)
GFR SERPL CREATININE-BSD FRML MDRD: 72 ML/MIN/1.73SQ M
GLUCOSE SERPL-MCNC: 102 MG/DL (ref 65–140)
HCT VFR BLD AUTO: 48 % (ref 36.5–49.3)
HGB BLD-MCNC: 15.6 G/DL (ref 12–17)
MCH RBC QN AUTO: 29.8 PG (ref 26.8–34.3)
MCHC RBC AUTO-ENTMCNC: 32.5 G/DL (ref 31.4–37.4)
MCV RBC AUTO: 92 FL (ref 82–98)
PLATELET # BLD AUTO: 325 THOUSANDS/UL (ref 149–390)
PMV BLD AUTO: 8.9 FL (ref 8.9–12.7)
POTASSIUM SERPL-SCNC: 4.7 MMOL/L (ref 3.5–5.3)
RBC # BLD AUTO: 5.24 MILLION/UL (ref 3.88–5.62)
SODIUM SERPL-SCNC: 135 MMOL/L (ref 135–147)
WBC # BLD AUTO: 12.42 THOUSAND/UL (ref 4.31–10.16)

## 2024-09-04 PROCEDURE — 86140 C-REACTIVE PROTEIN: CPT | Performed by: PHYSICIAN ASSISTANT

## 2024-09-04 PROCEDURE — 80048 BASIC METABOLIC PNL TOTAL CA: CPT

## 2024-09-04 PROCEDURE — 99232 SBSQ HOSP IP/OBS MODERATE 35: CPT | Performed by: NURSE PRACTITIONER

## 2024-09-04 PROCEDURE — 99232 SBSQ HOSP IP/OBS MODERATE 35: CPT | Performed by: PHYSICIAN ASSISTANT

## 2024-09-04 PROCEDURE — 85027 COMPLETE CBC AUTOMATED: CPT

## 2024-09-04 PROCEDURE — 85652 RBC SED RATE AUTOMATED: CPT | Performed by: PHYSICIAN ASSISTANT

## 2024-09-04 RX ORDER — PREDNISONE 20 MG/1
40 TABLET ORAL DAILY
Status: DISCONTINUED | OUTPATIENT
Start: 2024-09-05 | End: 2024-09-04

## 2024-09-04 RX ORDER — KETOROLAC TROMETHAMINE 30 MG/ML
15 INJECTION, SOLUTION INTRAMUSCULAR; INTRAVENOUS EVERY 6 HOURS SCHEDULED
Status: DISCONTINUED | OUTPATIENT
Start: 2024-09-04 | End: 2024-09-05 | Stop reason: HOSPADM

## 2024-09-04 RX ORDER — PREDNISONE 20 MG/1
40 TABLET ORAL DAILY
Status: DISCONTINUED | OUTPATIENT
Start: 2024-09-04 | End: 2024-09-05 | Stop reason: HOSPADM

## 2024-09-04 RX ADMIN — CLINDAMYCIN PHOSPHATE 600 MG: 600 INJECTION, SOLUTION INTRAVENOUS at 09:47

## 2024-09-04 RX ADMIN — Medication 2 G: at 09:47

## 2024-09-04 RX ADMIN — ENOXAPARIN SODIUM 40 MG: 40 INJECTION SUBCUTANEOUS at 09:47

## 2024-09-04 RX ADMIN — PREDNISONE 40 MG: 20 TABLET ORAL at 17:37

## 2024-09-04 RX ADMIN — Medication 2 G: at 14:23

## 2024-09-04 RX ADMIN — HYDROCODONE BITARTRATE AND ACETAMINOPHEN 1 TABLET: 5; 325 TABLET ORAL at 02:02

## 2024-09-04 RX ADMIN — KETOROLAC TROMETHAMINE 15 MG: 30 INJECTION, SOLUTION INTRAMUSCULAR; INTRAVENOUS at 17:37

## 2024-09-04 RX ADMIN — Medication 2 G: at 17:40

## 2024-09-04 RX ADMIN — KETOROLAC TROMETHAMINE 15 MG: 30 INJECTION, SOLUTION INTRAMUSCULAR at 09:47

## 2024-09-04 RX ADMIN — Medication 2 G: at 21:51

## 2024-09-04 RX ADMIN — KETOROLAC TROMETHAMINE 15 MG: 30 INJECTION, SOLUTION INTRAMUSCULAR; INTRAVENOUS at 21:51

## 2024-09-04 RX ADMIN — CLINDAMYCIN PHOSPHATE 600 MG: 600 INJECTION, SOLUTION INTRAVENOUS at 01:57

## 2024-09-04 NOTE — PROGRESS NOTES
"Progress Note - Orthopedics   Silas Lr 58 y.o. male MRN: [id number]  Unit/Bed#: -01      Subjective:    58 y.o.male with Right knee pain. Patient was seen in office 08/26/2024 and diagnosed with infrapatellar bursitis and mild cellulitis. Since admission and aspiration on 09/02/2024, patient reports he is feeling the same. He locates pain to the back of the Right heel and mild over base of the Right great toe. He admits he mostly gets gout in the toe, but is concerned about the traveling of gout and asking why he had \"a red streak\" in the front of his leg the other day.     Labs:  0   Lab Value Date/Time    HCT 48.0 09/04/2024 0500    HCT 45.2 09/03/2024 0601    HCT 50.2 (H) 09/02/2024 1146    HGB 15.6 09/04/2024 0500    HGB 15.2 09/03/2024 0601    HGB 16.5 09/02/2024 1146    WBC 12.42 (H) 09/04/2024 0500    WBC 11.26 (H) 09/03/2024 0601    WBC 12.19 (H) 09/02/2024 1146    ESR 21 (H) 09/02/2024 1146    CRP 20.1 (H) 09/02/2024 1146       Meds:    Current Facility-Administered Medications:     acetaminophen (TYLENOL) tablet 650 mg, 650 mg, Oral, Q6H PRN, Eleuterio Fu MD, 650 mg at 09/03/24 1627    aluminum-magnesium hydroxide-simethicone (MAALOX) oral suspension 30 mL, 30 mL, Oral, Q6H PRN, Eleuterio Fu MD    clindamycin in dextrose 5% (Cleocin) IVPB (premix) 600 mg 50 mL, 600 mg, Intravenous, Q8H, Eleuterio Fu MD, Last Rate: 100 mL/hr at 09/04/24 0157, 600 mg at 09/04/24 0157    Diclofenac Sodium (VOLTAREN) 1 % topical gel 2 g, 2 g, Topical, 4x Daily, Eleuterio Fu MD, 2 g at 09/03/24 2200    enoxaparin (LOVENOX) subcutaneous injection 40 mg, 40 mg, Subcutaneous, Q24H DIANNE, Eleuterio Fu MD, 40 mg at 09/03/24 0938    HYDROcodone-acetaminophen (NORCO) 5-325 mg per tablet 1 tablet, 1 tablet, Oral, Q6H PRN, Eleuterio Fu MD, 1 tablet at 09/04/24 0202    ketorolac (TORADOL) injection 15 mg, 15 mg, Intravenous, Once, Gus Moreno PA-C    " "magnesium hydroxide (MILK OF MAGNESIA) oral suspension 30 mL, 30 mL, Oral, Daily PRN, Eleuterio Fu MD    morphine injection 2 mg, 2 mg, Intravenous, Q4H PRN, Eleuterio Fu MD    ondansetron (ZOFRAN) injection 4 mg, 4 mg, Intravenous, Q6H PRN, Eleuterio Fu MD, 4 mg at 09/02/24 1909    Blood Culture:   Lab Results   Component Value Date    BLOODCX No Growth at 24 hrs. 09/02/2024       Wound Culture:   No results found for: \"WOUNDCULT\"    Ins and Outs:  I/O last 24 hours:  In: 360 [P.O.:360]  Out: -           Physical:  Vitals:    09/03/24 2145   BP: 130/78   Pulse: 64   Resp: 18   Temp: 99.7 °F (37.6 °C)   SpO2: 94%     Musculoskeletal:   Right knee   Skin without evidence of erythema  Small effusion palpated  Sensation intact to saphenous, sural, tibial, superficial peroneal nerve, and deep peroneal  Motor intact to +FHL/EHL, +ankle dorsi/plantar flexion  Passive range of motion to approximately 85-90 degrees with pain at terminal flexion  2+ DP pulse  Digits warm and well perfused  Capillary refill < 2 seconds  No calf swelling or tenderness to palpation      Tenderness over achilles tendon insertion Right ankle and base of Right great MTP     Assessment:    58 y.o.male Right knee gout     Plan:  WBAT RLE  PT/OT  Synovial fluid lab results positive for monosodium urate crystals, no growth on gram stain. WBC 1,816.   ESR 52 and CRP 39.4  Incentive spirometry encouraged  Pain control  DVT ppx- as per primary team  Dispo: Discussed with [facility]. No acute signs of infection and reasonable to begin steroids to decrease pain. Would recommend patient administering long term gout medication. Continue with MRI while inpatient.     Emily Brooke PA-C      "

## 2024-09-04 NOTE — PROGRESS NOTES
[facility]  Progress Note  Name: Silas Lr I  MRN: [id number]  Unit/Bed#: -01 I Date of Admission: 9/2/2024   Date of Service: 9/4/2024 I Hospital Day: 1    Assessment & Plan   * Pain and swelling of right knee  Assessment & Plan  Patient still complaining of pain and inability to bear weight.  Very low concern for septic arthritis, discontinue antibiotic therapy  Right knee joint aspiration: monosodium urate crystals  MRI Ordered, pending  Orthopedics consulted: recommend PT/OT  Discuss with orthopedic surgery - possible CSI vs oral steroids.  Continue with IV Toradol as PO Oxycodone makes patient nauseated. Tylenol ineffective.    Ambulatory dysfunction  Assessment & Plan  Secondary to the right knee pain and swelling  OT/PT: Level III  Continue to encourage appropriate mobility           VTE Pharmacologic Prophylaxis: VTE Score: 2 Low Risk (Score 0-2) - Encourage Ambulation. Lovenox ordered    Mobility:   Basic Mobility Inpatient Raw Score: 17  JH-HLM Goal: 5: Stand one or more mins  JH-HLM Achieved: 7: Walk 25 feet or more  JH-HLM Goal achieved. Continue to encourage appropriate mobility.      Education and Discussions with Family / Patient: Patient declined call to .     Total Time Spent on Date of Encounter in care of patient: 45 mins. This time was spent on one or more of the following: performing physical exam; counseling and coordination of care; obtaining or reviewing history; documenting in the medical record; reviewing/ordering tests, medications or procedures; communicating with other healthcare professionals and discussing with patient's family/caregivers.    Current Length of Stay: 1 day(s)  Current Patient Status: Inpatient   Certification Statement: The patient will continue to require additional inpatient hospital stay due to above outlined plan  Discharge Plan: Anticipate discharge in 24-48 hrs to home.    Code Status: Level 1 - Full  Code    Subjective:   Patient found lying in bed in obvious discomfort.  Patient states he still is experiencing little improvement and that his pain is the same.  He reports that PT was in to see him and that they had him standing and OOB but that he experienced significant pain as a result and had to return to bed.  He denies any chest pain, shortness of breath, headache, dizziness, abdominal pain, nausea/vomiting/diarrhea.      Objective:     Vitals:   Temp (24hrs), Av.2 °F (37.3 °C), Min:98 °F (36.7 °C), Max:99.8 °F (37.7 °C)    Temp:  [98 °F (36.7 °C)-99.8 °F (37.7 °C)] 98 °F (36.7 °C)  HR:  [57-77] 57  Resp:  [18-19] 19  BP: (126-130)/(78-84) 130/80  SpO2:  [94 %-96 %] 96 %  Body mass index is 29.39 kg/m².     Input and Output Summary (last 24 hours):     Intake/Output Summary (Last 24 hours) at 2024 1136  Last data filed at 2024 0856  Gross per 24 hour   Intake 300 ml   Output --   Net 300 ml       Physical Exam:   Physical Exam  Constitutional:       Appearance: He is not ill-appearing or toxic-appearing.   HENT:      Head: Normocephalic and atraumatic.      Mouth/Throat:      Mouth: Mucous membranes are moist.   Eyes:      Pupils: Pupils are equal, round, and reactive to light.   Cardiovascular:      Rate and Rhythm: Normal rate and regular rhythm.      Heart sounds: Normal heart sounds. No murmur heard.  Pulmonary:      Effort: Pulmonary effort is normal.      Breath sounds: Normal breath sounds.   Abdominal:      General: There is no distension.      Palpations: Abdomen is soft.      Tenderness: There is no abdominal tenderness. There is no guarding.   Musculoskeletal:         General: Swelling (Mild, peripatellar) and tenderness (Lateral and medial to R patella) present.      Cervical back: Neck supple. No tenderness.      Comments: Decreased ROM of RLE at the knee, but improved from previous day   Skin:     General: Skin is warm and dry.      Findings: Erythema (peripatellar) present.    Neurological:      Mental Status: He is alert and oriented to person, place, and time.   Psychiatric:         Mood and Affect: Mood normal.         Additional Data:     Labs:  Results from last 7 days   Lab Units 09/04/24  0500 09/03/24  0601   WBC Thousand/uL 12.42* 11.26*   HEMOGLOBIN g/dL 15.6 15.2   HEMATOCRIT % 48.0 45.2   PLATELETS Thousands/uL 325 297   SEGS PCT %  --  67   LYMPHO PCT %  --  24   MONO PCT %  --  7   EOS PCT %  --  2     Results from last 7 days   Lab Units 09/04/24  0500 09/03/24  0601   SODIUM mmol/L 135 135   POTASSIUM mmol/L 4.7 4.5   CHLORIDE mmol/L 101 101   CO2 mmol/L 27 26   BUN mg/dL 16 13   CREATININE mg/dL 1.10 1.07   ANION GAP mmol/L 7 8   CALCIUM mg/dL 9.7 9.6   ALBUMIN g/dL  --  3.9   TOTAL BILIRUBIN mg/dL  --  0.58   ALK PHOS U/L  --  57   ALT U/L  --  20   AST U/L  --  17   GLUCOSE RANDOM mg/dL 102 102                 Results from last 7 days   Lab Units 09/02/24  1146 08/30/24  1206   LACTIC ACID mmol/L 1.0  --    PROCALCITONIN ng/ml 0.06 <0.05       Lines/Drains:  Invasive Devices       Peripheral Intravenous Line  Duration             Peripheral IV 09/02/24 Right Antecubital 1 day                          Imaging: No pertinent imaging reviewed.    Recent Cultures (last 7 days):   Results from last 7 days   Lab Units 09/02/24  1708 09/02/24  1146 09/02/24  1139   BLOOD CULTURE   --  No Growth at 24 hrs. No Growth at 24 hrs.   GRAM STAIN RESULT  3+ Polys  No No organisms seen  --   --        Last 24 Hours Medication List:   Current Facility-Administered Medications   Medication Dose Route Frequency Provider Last Rate    acetaminophen  650 mg Oral Q6H PRN Eleuterio Fu MD      aluminum-magnesium hydroxide-simethicone  30 mL Oral Q6H PRN Eleuterio Fu MD      Diclofenac Sodium  2 g Topical 4x Daily Eleuterio Fu MD      enoxaparin  40 mg Subcutaneous Q24H [facility] Eleuterio Fu MD      HYDROcodone-acetaminophen  1 tablet Oral Q6H PRN Eleuterio  MD Nickie      ketorolac  15 mg Intravenous Q6H [facility] DAX Yap      magnesium hydroxide  30 mL Oral Daily PRN Eleuterio Fu MD      morphine injection  2 mg Intravenous Q4H PRN Eleuterio Fu MD      ondansetron  4 mg Intravenous Q6H PRN Eleuterio Fu MD          Today, Patient Was Seen By: DAX Yap    **Please Note: This note may have been constructed using a voice recognition system.**

## 2024-09-04 NOTE — PLAN OF CARE
Problem: PAIN - ADULT  Goal: Verbalizes/displays adequate comfort level or baseline comfort level  Description: Interventions:  - Encourage patient to monitor pain and request assistance  - Assess pain using appropriate pain scale  - Administer analgesics based on type and severity of pain and evaluate response  - Implement non-pharmacological measures as appropriate and evaluate response  - Consider cultural and social influences on pain and pain management  - Notify physician/advanced practitioner if interventions unsuccessful or patient reports new pain  Outcome: Progressing     Problem: INFECTION - ADULT  Goal: Absence or prevention of progression during hospitalization  Description: INTERVENTIONS:  - Assess and monitor for signs and symptoms of infection  - Monitor lab/diagnostic results  - Monitor all insertion sites, i.e. indwelling lines, tubes, and drains  - Monitor endotracheal if appropriate and nasal secretions for changes in amount and color  - [place] appropriate cooling/warming therapies per order  - Administer medications as ordered  - Instruct and encourage patient and family to use good hand hygiene technique  - Identify and instruct in appropriate isolation precautions for identified infection/condition  Outcome: Progressing  Goal: Absence of fever/infection during neutropenic period  Description: INTERVENTIONS:  - Monitor WBC    Outcome: Progressing     Problem: SAFETY ADULT  Goal: Patient will remain free of falls  Description: INTERVENTIONS:  - Educate patient/family on patient safety including physical limitations  - Instruct patient to call for assistance with activity   - Consult OT/PT to assist with strengthening/mobility   - Keep Call bell within reach  - Keep bed low and locked with side rails adjusted as appropriate  - Keep care items and personal belongings within reach  - Initiate and maintain comfort rounds  - Make Fall Risk Sign visible to staff  - Offer Toileting every 4 Hours,  in advance of need  - Initiate/Maintain bed / chair alarm  - Obtain necessary fall risk management equipment: call bell within reach   - Apply yellow socks and bracelet for high fall risk patients  - Consider moving patient to room near nurses station  Outcome: Progressing  Goal: Maintain or return to baseline ADL function  Description: INTERVENTIONS:  -  Assess patient's ability to carry out ADLs; assess patient's baseline for ADL function and identify physical deficits which impact ability to perform ADLs (bathing, care of mouth/teeth, toileting, grooming, dressing, etc.)  - Assess/evaluate cause of self-care deficits   - Assess range of motion  - Assess patient's mobility; develop plan if impaired  - Assess patient's need for assistive devices and provide as appropriate  - Encourage maximum independence but intervene and supervise when necessary  - Involve family in performance of ADLs  - Assess for home care needs following discharge   - Consider OT consult to assist with ADL evaluation and planning for discharge  - Provide patient education as appropriate  Outcome: Progressing  Goal: Maintains/Returns to pre admission functional level  Description: INTERVENTIONS:  - Perform AM-PAC 6 Click Basic Mobility/ Daily Activity assessment daily.  - Set and communicate daily mobility goal to care team and patient/family/caregiver.   - Collaborate with rehabilitation services on mobility goals if consulted  - Perform Range of Motion 4 times a day.  - Reposition patient every 2 hours.  - Dangle patient 3 times a day  - Stand patient 3 times a day  - Ambulate patient 3 times a day  - Out of bed to chair 3 times a day   - Out of bed for meals 3 times a day  - Out of bed for toileting  - Record patient progress and toleration of activity level   Outcome: Progressing     Problem: DISCHARGE PLANNING  Goal: Discharge to home or other facility with appropriate resources  Description: INTERVENTIONS:  - Identify barriers to  discharge w/patient and caregiver  - Arrange for needed discharge resources and transportation as appropriate  - Identify discharge learning needs (meds, wound care, etc.)  - Arrange for interpretive services to assist at discharge as needed  - Refer to Case Management Department for coordinating discharge planning if the patient needs post-hospital services based on physician/advanced practitioner order or complex needs related to functional status, cognitive ability, or social support system  Outcome: Progressing     Problem: Knowledge Deficit  Goal: Patient/family/caregiver demonstrates understanding of disease process, treatment plan, medications, and discharge instructions  Description: Complete learning assessment and assess knowledge base.  Interventions:  - Provide teaching at level of understanding  - Provide teaching via preferred learning methods  Outcome: Progressing

## 2024-09-04 NOTE — ASSESSMENT & PLAN NOTE
Patient still complaining of pain and inability to bear weight.  Very low concern for septic arthritis, discontinue antibiotic therapy  Right knee joint aspiration: monosodium urate crystals  MRI Ordered, pending  Orthopedics consulted: recommend PT/OT  Discuss with orthopedic surgery - possible CSI vs oral steroids.  Continue with IV Toradol as PO Oxycodone makes patient nauseated. Tylenol ineffective.

## 2024-09-04 NOTE — PLAN OF CARE
Problem: PAIN - ADULT  Goal: Verbalizes/displays adequate comfort level or baseline comfort level  Description: Interventions:  - Encourage patient to monitor pain and request assistance  - Assess pain using appropriate pain scale  - Administer analgesics based on type and severity of pain and evaluate response  - Implement non-pharmacological measures as appropriate and evaluate response  - Consider cultural and social influences on pain and pain management  - Notify physician/advanced practitioner if interventions unsuccessful or patient reports new pain  Outcome: Progressing     Problem: INFECTION - ADULT  Goal: Absence or prevention of progression during hospitalization  Description: INTERVENTIONS:  - Assess and monitor for signs and symptoms of infection  - Monitor lab/diagnostic results  - Monitor all insertion sites, i.e. indwelling lines, tubes, and drains  - Monitor endotracheal if appropriate and nasal secretions for changes in amount and color  - [place] appropriate cooling/warming therapies per order  - Administer medications as ordered  - Instruct and encourage patient and family to use good hand hygiene technique  - Identify and instruct in appropriate isolation precautions for identified infection/condition  Outcome: Progressing  Goal: Absence of fever/infection during neutropenic period  Description: INTERVENTIONS:  - Monitor WBC    Outcome: Progressing     Problem: SAFETY ADULT  Goal: Patient will remain free of falls  Description: INTERVENTIONS:  - Educate patient/family on patient safety including physical limitations  - Instruct patient to call for assistance with activity   - Consult OT/PT to assist with strengthening/mobility   - Keep Call bell within reach  - Keep bed low and locked with side rails adjusted as appropriate  - Keep care items and personal belongings within reach  - Initiate and maintain comfort rounds  - Make Fall Risk Sign visible to staff  - Offer Toileting every 2 Hours,  in advance of need  - Initiate/Maintain bed alarm  - Obtain necessary fall risk management equipment:   - Apply yellow socks and bracelet for high fall risk patients  - Consider moving patient to room near nurses station  Outcome: Progressing  Goal: Maintain or return to baseline ADL function  Description: INTERVENTIONS:  -  Assess patient's ability to carry out ADLs; assess patient's baseline for ADL function and identify physical deficits which impact ability to perform ADLs (bathing, care of mouth/teeth, toileting, grooming, dressing, etc.)  - Assess/evaluate cause of self-care deficits   - Assess range of motion  - Assess patient's mobility; develop plan if impaired  - Assess patient's need for assistive devices and provide as appropriate  - Encourage maximum independence but intervene and supervise when necessary  - Involve family in performance of ADLs  - Assess for home care needs following discharge   - Consider OT consult to assist with ADL evaluation and planning for discharge  - Provide patient education as appropriate  Outcome: Progressing  Goal: Maintains/Returns to pre admission functional level  Description: INTERVENTIONS:  - Perform AM-PAC 6 Click Basic Mobility/ Daily Activity assessment daily.  - Set and communicate daily mobility goal to care team and patient/family/caregiver.   - Collaborate with rehabilitation services on mobility goals if consulted  - Perform Range of Motion 2 times a day.  - Reposition patient every 2 hours.  - Dangle patient 2 times a day  - Stand patient 2 times a day  - Ambulate patient 2 times a day  - Out of bed to chair 2 times a day   - Out of bed for meals 2 times a day  - Out of bed for toileting  - Record patient progress and toleration of activity level   Outcome: Progressing     Problem: DISCHARGE PLANNING  Goal: Discharge to home or other facility with appropriate resources  Description: INTERVENTIONS:  - Identify barriers to discharge w/patient and caregiver  -  Arrange for needed discharge resources and transportation as appropriate  - Identify discharge learning needs (meds, wound care, etc.)  - Arrange for interpretive services to assist at discharge as needed  - Refer to Case Management Department for coordinating discharge planning if the patient needs post-hospital services based on physician/advanced practitioner order or complex needs related to functional status, cognitive ability, or social support system  Outcome: Progressing     Problem: Knowledge Deficit  Goal: Patient/family/caregiver demonstrates understanding of disease process, treatment plan, medications, and discharge instructions  Description: Complete learning assessment and assess knowledge base.  Interventions:  - Provide teaching at level of understanding  - Provide teaching via preferred learning methods  Outcome: Progressing

## 2024-09-05 ENCOUNTER — APPOINTMENT (INPATIENT)
Dept: MRI IMAGING | Facility: HOSPITAL | Age: 59
DRG: 603 | End: 2024-09-05
Payer: MEDICARE

## 2024-09-05 VITALS
HEIGHT: 72 IN | HEART RATE: 55 BPM | TEMPERATURE: 98.1 F | OXYGEN SATURATION: 95 % | BODY MASS INDEX: 29.35 KG/M2 | SYSTOLIC BLOOD PRESSURE: 144 MMHG | WEIGHT: 216.71 LBS | RESPIRATION RATE: 20 BRPM | DIASTOLIC BLOOD PRESSURE: 77 MMHG

## 2024-09-05 PROBLEM — M10.061 ACUTE IDIOPATHIC GOUT OF RIGHT KNEE: Status: ACTIVE | Noted: 2024-09-02

## 2024-09-05 PROCEDURE — 99239 HOSP IP/OBS DSCHRG MGMT >30: CPT | Performed by: NURSE PRACTITIONER

## 2024-09-05 PROCEDURE — 73721 MRI JNT OF LWR EXTRE W/O DYE: CPT

## 2024-09-05 PROCEDURE — 99232 SBSQ HOSP IP/OBS MODERATE 35: CPT | Performed by: ORTHOPAEDIC SURGERY

## 2024-09-05 RX ORDER — HYDROCODONE BITARTRATE AND ACETAMINOPHEN 5; 325 MG/1; MG/1
1 TABLET ORAL EVERY 6 HOURS PRN
Qty: 20 TABLET | Refills: 0 | Status: SHIPPED | OUTPATIENT
Start: 2024-09-05 | End: 2024-09-10

## 2024-09-05 RX ORDER — PREDNISONE 20 MG/1
TABLET ORAL
Qty: 12 TABLET | Refills: 0 | Status: SHIPPED | OUTPATIENT
Start: 2024-09-06 | End: 2024-09-15

## 2024-09-05 RX ADMIN — KETOROLAC TROMETHAMINE 15 MG: 30 INJECTION, SOLUTION INTRAMUSCULAR; INTRAVENOUS at 03:28

## 2024-09-05 RX ADMIN — KETOROLAC TROMETHAMINE 15 MG: 30 INJECTION, SOLUTION INTRAMUSCULAR; INTRAVENOUS at 09:32

## 2024-09-05 RX ADMIN — PREDNISONE 40 MG: 20 TABLET ORAL at 09:32

## 2024-09-05 NOTE — PLAN OF CARE
Problem: PAIN - ADULT  Goal: Verbalizes/displays adequate comfort level or baseline comfort level  Description: Interventions:  - Encourage patient to monitor pain and request assistance  - Assess pain using appropriate pain scale  - Administer analgesics based on type and severity of pain and evaluate response  - Implement non-pharmacological measures as appropriate and evaluate response  - Consider cultural and social influences on pain and pain management  - Notify physician/advanced practitioner if interventions unsuccessful or patient reports new pain  Outcome: Progressing     Problem: INFECTION - ADULT  Goal: Absence or prevention of progression during hospitalization  Description: INTERVENTIONS:  - Assess and monitor for signs and symptoms of infection  - Monitor lab/diagnostic results  - Monitor all insertion sites, i.e. indwelling lines, tubes, and drains  - Monitor endotracheal if appropriate and nasal secretions for changes in amount and color  - [place] appropriate cooling/warming therapies per order  - Administer medications as ordered  - Instruct and encourage patient and family to use good hand hygiene technique  - Identify and instruct in appropriate isolation precautions for identified infection/condition  Outcome: Progressing  Goal: Absence of fever/infection during neutropenic period  Description: INTERVENTIONS:  - Monitor WBC    Outcome: Progressing     Problem: SAFETY ADULT  Goal: Patient will remain free of falls  Description: INTERVENTIONS:  - Educate patient/family on patient safety including physical limitations  - Instruct patient to call for assistance with activity   - Consult OT/PT to assist with strengthening/mobility   - Keep Call bell within reach  - Keep bed low and locked with side rails adjusted as appropriate  - Keep care items and personal belongings within reach  - Initiate and maintain comfort rounds  - Make Fall Risk Sign visible to staff  - Offer Toileting every 2 Hours,  in advance of need  - Initiate/Maintain bed alarm  - Obtain necessary fall risk management equipment: yellow socks  - Apply yellow socks and bracelet for high fall risk patients  - Consider moving patient to room near nurses station  Outcome: Progressing  Goal: Maintain or return to baseline ADL function  Description: INTERVENTIONS:  -  Assess patient's ability to carry out ADLs; assess patient's baseline for ADL function and identify physical deficits which impact ability to perform ADLs (bathing, care of mouth/teeth, toileting, grooming, dressing, etc.)  - Assess/evaluate cause of self-care deficits   - Assess range of motion  - Assess patient's mobility; develop plan if impaired  - Assess patient's need for assistive devices and provide as appropriate  - Encourage maximum independence but intervene and supervise when necessary  - Involve family in performance of ADLs  - Assess for home care needs following discharge   - Consider OT consult to assist with ADL evaluation and planning for discharge  - Provide patient education as appropriate  Outcome: Progressing  Goal: Maintains/Returns to pre admission functional level  Description: INTERVENTIONS:  - Perform AM-PAC 6 Click Basic Mobility/ Daily Activity assessment daily.  - Set and communicate daily mobility goal to care team and patient/family/caregiver.   - Collaborate with rehabilitation services on mobility goals if consulted  - Perform Range of Motion 2 times a day.  - Reposition patient every 2 hours.  - Dangle patient 2 times a day  - Stand patient 2 times a day  - Ambulate patient 2 times a day  - Out of bed to chair 2 times a day   - Out of bed for meals 2 times a day  - Out of bed for toileting  - Record patient progress and toleration of activity level   Outcome: Progressing

## 2024-09-05 NOTE — DISCHARGE INSTR - AVS FIRST PAGE
Discharge Instructions - Orthopedics  Silas Lr 58 y.o. male MRN: [id number]  Unit/Bed#: [facility] MRI    Weight Bearing Status:                                           Weightbearing as tolerated Right lower extremity.  Walker for safety during ambulation, as needed    DVT prophylaxis:  As per primary team    Pain:  Continue analgesics as directed. Please take with food and water.     Dressing Instructions:   N/A    Appt Instructions:   Follow up in office with orthopaedics as needed     Contact the office sooner if you experience any increased numbness/tingling in the extremities.

## 2024-09-05 NOTE — ASSESSMENT & PLAN NOTE
Patient presented to the ED with complaints of 2 week duration of right knee swelling, pain.  There was concern for possible infection, and he was given IV antibiotics.  Orthopedic surgery consulted  Aspiration completed  + monosodium urate crystals  Initiated on PO Prednisone for acute gout flare - noted improvement.  Recommend to discuss daily maintenance treatment for Gout.

## 2024-09-05 NOTE — CASE MANAGEMENT
Case Management Discharge Planning Note    Patient name Silas Lr  Location /-01 MRN [id number]  : 1965 Date 2024       Current Admission Date: 2024  Current Admission Diagnosis:Acute idiopathic gout of right knee   Patient Active Problem List    Diagnosis Date Noted Date Diagnosed    Acute idiopathic gout of right knee 2024     Ambulatory dysfunction 2024       LOS (days): 2  Geometric Mean LOS (GMLOS) (days): 2.8  Days to GMLOS:1     OBJECTIVE:  Risk of Unplanned Readmission Score: 6.14         Current admission status: Inpatient   Preferred Pharmacy:   [id number] - [place] - [address]  [address]  [place] [id number]  Phone: [phone] Fax: [phone]    Primary Care Provider: No primary care provider on file.    Primary Insurance: MEDICARE  Secondary Insurance: [employer]    DISCHARGE DETAILS:  Patient medically cleared for discharge with no CM needs. Walker dispensed from consignment closet to patient at bedside.

## 2024-09-05 NOTE — DISCHARGE SUMMARY
[facility]  Discharge- Silas Lr 1965, 58 y.o. male MRN: [id number]  Unit/Bed#: -01 Encounter: [id number]  Primary Care Provider: No primary care provider on file.   Date and time admitted to hospital: 9/2/2024 10:21 AM    * Acute idiopathic gout of right knee  Assessment & Plan  Patient presented to the ED with complaints of 2 week duration of right knee swelling, pain.  There was concern for possible infection, and he was given IV antibiotics.  Orthopedic surgery consulted  Aspiration completed  + monosodium urate crystals  Initiated on PO Prednisone for acute gout flare - noted improvement.  Recommend to discuss daily maintenance treatment for Gout.    Ambulatory dysfunction  Assessment & Plan  Improving with reduction in pain  Continue to ambulate as tolerated to regain mobility/function        Medical Problems       Resolved Problems  Date Reviewed: 9/5/2024   None       Discharging Physician / Practitioner: DAX Yap  PCP: No primary care provider on file.  Admission Date:   Admission Orders (From admission, onward)       Ordered        09/03/24 1606  INPATIENT ADMISSION  Once            09/02/24 1627  Place in Observation  Once                          Discharge Date: 09/05/24    Consultations During Hospital Stay:  IP CONSULT TO ORTHOPEDIC SURGERY    Procedures Performed:   Right knee aspiration 9/2    Significant Findings / Test Results:   MRI knee right  wo contrast   Final Result by Jose Carlos Conklin MD (09/05 2453)   1. Undersurface posterior horn medial meniscus tear with small flipped fragment extending into the meniscotibial recess. Lateral meniscus remains intact.   2. Degenerative change with non full-thickness cartilage loss as above. Associated moderate joint effusion.   3. Small Baker's cyst.   4. No MR findings of inflammatory arthropathy.                     Workstation performed: [id number]         VAS lower limb venous duplex  study, unilateral/limited   Final Result by Luis Morgan MD (09/02 8587)          Incidental Findings:   None     Test Results Pending at Discharge (will require follow up):   None     Outpatient Tests Requested:  Follow up with PCP within 1 wk  Follow up with Ortho    Complications:  None    Reason for Admission: Right knee gout flare    Hospital Course:   Silas Lr is a 58 y.o. male patient with no known past medical history with presented to the hospital on 9/2/2024 due to complaints of pain and inability to bear weight for 2 weeks prior to coming into the ER.  Orthopedic surgery was consulted and patient had an aspiration completed of the right knee which was sent off for testing and was noted to be positive for monosodium urate crystals.  Patient was maintained on antibiotics for concern of cellulitis and overall symptoms improved.  Patient received first dose of oral prednisone on 9/4 with much improvement to symptoms movement overall.      Cleared for discharge at this time and will follow-up with orthopedic surgery outpatient    Please see above list of diagnoses and related plan for additional information.     Condition at Discharge: stable    Discharge Day Visit / Exam:   * Please refer to separate progress note for these details *    Discussion with Family: Patient declined call to .     Discharge instructions/Information to patient and family:   See after visit summary for information provided to patient and family.      Provisions for Follow-Up Care:  See after visit summary for information related to follow-up care and any pertinent home health orders.      Mobility at time of Discharge:   Basic Mobility Inpatient Raw Score: 24  JH-HLM Goal: 8: Walk 250 feet or more  JH-HLM Achieved: 7: Walk 25 feet or more  HLM Goal NOT achieved. Continue to encourage mobility in post discharge setting.     Disposition:   Home with VNA Services (Reminder: Complete face to  face encounter)    Planned Readmission: None     Discharge Statement:  I spent 40 minutes discharging the patient. This time was spent on the day of discharge. I had direct contact with the patient on the day of discharge. Greater than 50% of the total time was spent examining patient, answering all patient questions, arranging and discussing plan of care with patient as well as directly providing post-discharge instructions.  Additional time then spent on discharge activities.    Discharge Medications:  See after visit summary for reconciled discharge medications provided to patient and/or family.      **Please Note: This note may have been constructed using a voice recognition system**

## 2024-09-05 NOTE — PROGRESS NOTES
Progress Note - Orthopedics   Silas Lr 58 y.o. male MRN: [id number]  Unit/Bed#: [facility] MRI      Subjective:    58 y.o.male with Right knee gout. Today, patient reports significant improvement since evaluation yesterday. Patient has been able to get up and ambulate with the walker. He admits to mild discomfort of the knee with weightbearing. He also admits to mild residual heel pain with ambulation. Patient reports he just came back from the MRI.     Labs:  0   Lab Value Date/Time    HCT 48.0 09/04/2024 0500    HCT 45.2 09/03/2024 0601    HCT 50.2 (H) 09/02/2024 1146    HGB 15.6 09/04/2024 0500    HGB 15.2 09/03/2024 0601    HGB 16.5 09/02/2024 1146    WBC 12.42 (H) 09/04/2024 0500    WBC 11.26 (H) 09/03/2024 0601    WBC 12.19 (H) 09/02/2024 1146    ESR 52 (H) 09/04/2024 0848    CRP 39.4 (H) 09/04/2024 0848       Meds:    Current Facility-Administered Medications:     acetaminophen (TYLENOL) tablet 650 mg, 650 mg, Oral, Q6H PRN, Eleuterio Fu MD, 650 mg at 09/03/24 1627    aluminum-magnesium hydroxide-simethicone (MAALOX) oral suspension 30 mL, 30 mL, Oral, Q6H PRN, Eleuterio Fu MD    Diclofenac Sodium (VOLTAREN) 1 % topical gel 2 g, 2 g, Topical, 4x Daily, Eleuterio Fu MD, 2 g at 09/04/24 2151    enoxaparin (LOVENOX) subcutaneous injection 40 mg, 40 mg, Subcutaneous, Q24H DIANNE, Eleuterio Fu MD, 40 mg at 09/04/24 0947    HYDROcodone-acetaminophen (NORCO) 5-325 mg per tablet 1 tablet, 1 tablet, Oral, Q6H PRN, Eleuterio Fu MD, 1 tablet at 09/04/24 0202    ketorolac (TORADOL) injection 15 mg, 15 mg, Intravenous, Q6H DIANNE, DAX Yap, 15 mg at 09/05/24 0328    magnesium hydroxide (MILK OF MAGNESIA) oral suspension 30 mL, 30 mL, Oral, Daily PRN, Eleuterio Fu MD    morphine injection 2 mg, 2 mg, Intravenous, Q4H PRN, Eleuterio Fu MD    ondansetron (ZOFRAN) injection 4 mg, 4 mg, Intravenous, Q6H PRN, Eleuterio Fu MD, 4 mg at  "09/02/24 1909    predniSONE tablet 40 mg, 40 mg, Oral, Daily, DAX Yap, 40 mg at 09/04/24 1737    Blood Culture:   Lab Results   Component Value Date    BLOODCX No Growth at 48 hrs. 09/02/2024       Wound Culture:   No results found for: \"WOUNDCULT\"    Ins and Outs:  I/O last 24 hours:  In: 940 [P.O.:940]  Out: -           Physical:  Vitals:    09/05/24 0758   BP: 144/77   Pulse: 55   Resp:    Temp: 98.1 °F (36.7 °C)   SpO2: 95%     Musculoskeletal:   Right knee  Skin without evidence of infection, erythema or ecchymosis. Small puncture wound at anteromedial knee s/p aspiration in ED  Trace to small effusion palpated   TTP achilles insertion of calcaneus   Able to stand from sitting position  Active ROM to approximately 90 degrees while sitting at bed side, mild discomfort  Sensation intact to saphenous, sural, tibial, superficial peroneal nerve, and deep peroneal  Motor intact to +FHL/EHL, +ankle dorsi/plantar flexion  2+ DP pulse  Digits warm and well perfused  Capillary refill < 2 seconds  No calf swelling or tenderness to palpation    Assessment:    58 y.o.male Right knee gout, likely Right heel and great toe gout      Plan:  WBAT RLE, walker as needed  PT/OT  Incentive spirometry encouraged  Pain control  DVT ppx as per primary  Dispo: At this time, no emergent surgical intervention needed. Will review MRI and patient may follow up outpatient. Would recommend patient following with PCP to discuss gout symptoms and long term medication. Continue OTC medications. Encouraged range of motion and strengthening of Right lower extremity. Signing off.     Emily Brooke PA-C      "

## 2024-09-08 LAB
BACTERIA BLD CULT: NORMAL
BACTERIA BLD CULT: NORMAL

## 2024-09-13 ENCOUNTER — OFFICE VISIT (OUTPATIENT)
Dept: OBGYN CLINIC | Facility: CLINIC | Age: 59
End: 2024-09-13
Payer: MEDICARE

## 2024-09-13 VITALS
DIASTOLIC BLOOD PRESSURE: 92 MMHG | HEIGHT: 72 IN | WEIGHT: 217.4 LBS | BODY MASS INDEX: 29.45 KG/M2 | HEART RATE: 57 BPM | SYSTOLIC BLOOD PRESSURE: 145 MMHG

## 2024-09-13 DIAGNOSIS — M17.11 PRIMARY OSTEOARTHRITIS OF RIGHT KNEE: ICD-10-CM

## 2024-09-13 DIAGNOSIS — M10.9 GOUT OF RIGHT KNEE, UNSPECIFIED CAUSE, UNSPECIFIED CHRONICITY: Primary | ICD-10-CM

## 2024-09-13 PROCEDURE — 99213 OFFICE O/P EST LOW 20 MIN: CPT | Performed by: ORTHOPAEDIC SURGERY

## 2024-09-13 RX ORDER — PREDNISONE 10 MG/1
TABLET ORAL
COMMUNITY
Start: 2024-09-05

## 2024-09-13 NOTE — PROGRESS NOTES
Patient Name:  Silas Lr  MRN:  [id number]    Assessment & Plan     1. Gout of right knee, unspecified cause, unspecified chronicity  -     Ambulatory Referral to Physical Therapy; Future  2. Primary osteoarthritis of right knee  -     Ambulatory Referral to Physical Therapy; Future    Right knee effusion, gout  Overall, patient significantly improved since hospital admission with continued oral steroids  At this time, recommended continued nonoperative management of Right knee including outpatient PT to work on stretching and strengthening.  Recommended following with PCP for discussion of long term gout treatments   Complete oral steroids as prescribed   Follow up as needed      History of the Present Illness   Silas Lr is a 58 y.o. male with Right knee gout and effusion. Patient was seen in the hospital and diagnosed with Right knee gout from synovial fluid testing. Patient was provided steroids while inpatient with significant improvement of symptoms. Today, patient reports he is about 70-75% improved from hospital admission. He locates residual discomfort to the lateral aspect of the knee with attempted range of motion, ambulation and climbing stairs. He admits the knee swelling is moderately improved. He has continued oral steroids and tomorrow is his last day of administration.          Review of Systems     Review of Systems   Constitutional:  Negative for chills and fever.   HENT:  Negative for ear pain and sore throat.    Eyes:  Negative for pain and visual disturbance.   Respiratory:  Negative for cough and shortness of breath.    Cardiovascular:  Negative for chest pain and palpitations.   Gastrointestinal:  Negative for abdominal pain and vomiting.   Genitourinary:  Negative for dysuria and hematuria.   Musculoskeletal:  Negative for arthralgias and back pain.   Skin:  Negative for color change and rash.   Neurological:  Negative for seizures and syncope.   All other  systems reviewed and are negative.      Physical Exam     /92   Pulse 57   Ht 6' (1.829 m)   Wt 98.6 kg (217 lb 6.4 oz)   BMI 29.48 kg/m²     Right Knee  Range of motion from 0 to 120-125 with subjective lateral tightness and discomfort.    There is no effusion.    There is minimal to no tenderness over the IT band.    The patient is able to perform a straight leg raise with 5/5 quad strength.    Varus stress testing reveals no pain or instability at 0 and 30 degrees   Valgus stress testing reveals no pain or instability at 0 and 30 degrees  The patient is neurovascular intact distally.      Data Review     I have personally reviewed pertinent films in PACS, and my interpretation follows.    X-rays taken 08/21/2021 of Right knee demonstrate mild medial compartment joint space narrowing with minimal osteophytes noted. No fracture or dislocation noted.     MRI performed 09/05/2024 of Right knee demonstrates degenerative changes of medial compartment with grade 2 cartilage loss, as well as at lateral patellar facet. Associated medial meniscus tear with likely extension to meniscotibial recess.     Social History     Tobacco Use    Smoking status: Never    Smokeless tobacco: Never   Vaping Use    Vaping status: Never Used   Substance Use Topics    Alcohol use: Not Currently    Drug use: Never           Procedures  None     Emily Brooke PA-C

## 2024-09-16 LAB
DME PARACHUTE DELIVERY DATE ACTUAL: NORMAL
DME PARACHUTE DELIVERY DATE REQUESTED: NORMAL
DME PARACHUTE ITEM DESCRIPTION: NORMAL
DME PARACHUTE ORDER STATUS: NORMAL
DME PARACHUTE SUPPLIER NAME: NORMAL
DME PARACHUTE SUPPLIER PHONE: NORMAL

## 2024-09-17 ENCOUNTER — TELEPHONE (OUTPATIENT)
Age: 59
End: 2024-09-17

## 2024-09-17 NOTE — TELEPHONE ENCOUNTER
Received call from patient for new patient appt.    I offered the next available in 2025.    Patient declined.

## 2024-09-23 ENCOUNTER — TELEPHONE (OUTPATIENT)
Age: 59
End: 2024-09-23

## 2024-09-23 NOTE — TELEPHONE ENCOUNTER
Patient called for new patient appointment for warts.  Offered soonest appointment in [place] June 2025.  Patient will seek treatment at another location.

## 2025-03-27 ENCOUNTER — CONSULT (OUTPATIENT)
Dept: GASTROENTEROLOGY | Facility: CLINIC | Age: 60
End: 2025-03-27
Payer: MEDICARE

## 2025-03-27 VITALS
HEIGHT: 72 IN | TEMPERATURE: 97.6 F | RESPIRATION RATE: 18 BRPM | OXYGEN SATURATION: 98 % | DIASTOLIC BLOOD PRESSURE: 84 MMHG | HEART RATE: 67 BPM | WEIGHT: 218 LBS | SYSTOLIC BLOOD PRESSURE: 134 MMHG | BODY MASS INDEX: 29.53 KG/M2

## 2025-03-27 DIAGNOSIS — R07.9 CHEST PAIN, UNSPECIFIED TYPE: ICD-10-CM

## 2025-03-27 DIAGNOSIS — Z86.0100 HISTORY OF COLON POLYPS: ICD-10-CM

## 2025-03-27 DIAGNOSIS — K76.0 FATTY LIVER: ICD-10-CM

## 2025-03-27 DIAGNOSIS — K21.9 GASTROESOPHAGEAL REFLUX DISEASE WITHOUT ESOPHAGITIS: Primary | ICD-10-CM

## 2025-03-27 DIAGNOSIS — K22.70 BARRETT'S ESOPHAGUS WITHOUT DYSPLASIA: ICD-10-CM

## 2025-03-27 DIAGNOSIS — R13.10 ODYNOPHAGIA: ICD-10-CM

## 2025-03-27 PROCEDURE — 99204 OFFICE O/P NEW MOD 45 MIN: CPT | Performed by: INTERNAL MEDICINE

## 2025-03-27 RX ORDER — PANTOPRAZOLE SODIUM 40 MG/1
40 TABLET, DELAYED RELEASE ORAL 2 TIMES DAILY
Qty: 60 TABLET | Refills: 2 | Status: SHIPPED | OUTPATIENT
Start: 2025-03-27

## 2025-03-27 RX ORDER — ALLOPURINOL 100 MG/1
100 TABLET ORAL DAILY
COMMUNITY
Start: 2025-03-19

## 2025-03-27 NOTE — H&P (VIEW-ONLY)
Name: Silas Lr      : 1965      MRN: [id number]  Encounter Provider: Radha Kent MD  Encounter Date: 3/27/2025   Encounter department: [facility] [place]  :  Assessment & Plan  Gastroesophageal reflux disease without esophagitis  See plan below  Orders:    pantoprazole (PROTONIX) 40 mg tablet; Take 1 tablet (40 mg total) by mouth 2 (two) times a day    Ace's esophagus without dysplasia  Previous EGD with Ace's esophagus roughly 5 years ago.  Not on PPI.  Does have significant chest pain sometimes with eating.  Rare intermittent heartburn  Start Protonix 40 mg twice daily after EGD  Plan for EGD for further evaluation  GERD lifestyle changes discussed, including avoidance of trigger foods (potential foods include coffee, caffeine, chocolate, mint, tomato-based products, spicy foods, fatty foods), avoid tight fitting clothing, elevated head of bed 30 degrees, avoid eating 2-3 hours prior to bedtime, weight loss, avoid alcohol, avoid tobacco use.       Chest pain, unspecified type  See plan above       History of colon polyps  Last colonoscopy  with multiple colon polyps, sigmoid diverticulosis and internal hemorrhoids.  Plan for repeat colonoscopy       Odynophagia  Reports history of some pain with swallowing.  Will plan for EGD as above.       Fatty liver  Mild and noted on MR enterography in .  No significant alcohol use.  BMI 29.6.  No obvious signs or symptoms of cirrhosis on labs or imaging  Counseled on diet, exercise and weight loss (recommend 5 to 10%)  Avoid alcohol  Consider elastography             History of Present Illness   Silas Lr is a 59 y.o. male who presents for GERD, chest pain, Ace's esophagus and history of colon polyps.    He states he has been having chest pain and saw cardiology and no etiology discovered likely related to heartburn.  Pain is worse with eating but sometimes can occur without  eating.    No dysphagia    Has odynophagia          Last EGD and colonoscopy 2019 as above  HPI  History obtained from: patient  Review of Systems A complete review of systems is negative other than that noted above in the HPI.    Medical History Reviewed by provider this encounter:     .  Past Medical History   History reviewed. No pertinent past medical history.  History reviewed. No pertinent surgical history.  History reviewed. No pertinent family history.   reports that he has never smoked. He has never used smokeless tobacco. He reports that he does not currently use alcohol. He reports that he does not use drugs.  Current Outpatient Medications   Medication Instructions    allopurinol (ZYLOPRIM) 100 mg, Daily    Diclofenac Sodium (VOLTAREN) 2 g, Topical, 4 times daily    pantoprazole (PROTONIX) 40 mg, Oral, 2 times daily    predniSONE 10 mg tablet TAKE 4 TABLETS DAILY FOR 3 DAYS, 3 TABS X 2 DAYS, 2 TABS X 2 DAYS, 1 TAB X 2 DAYS (START 9/6/24)     Allergies   Allergen Reactions    Penicillins Angioedema      Current Outpatient Medications on File Prior to Visit   Medication Sig Dispense Refill    allopurinol (ZYLOPRIM) 100 mg tablet Take 100 mg by mouth daily      Diclofenac Sodium (VOLTAREN) 1 % Apply 2 g topically 4 (four) times a day 100 g 0    predniSONE 10 mg tablet TAKE 4 TABLETS DAILY FOR 3 DAYS, 3 TABS X 2 DAYS, 2 TABS X 2 DAYS, 1 TAB X 2 DAYS (START 9/6/24) (Patient not taking: Reported on 3/27/2025)       No current facility-administered medications on file prior to visit.         Objective   /84 (BP Location: Left arm, Patient Position: Sitting, Cuff Size: Standard)   Pulse 67   Temp 97.6 °F (36.4 °C) (Tympanic)   Resp 18   Ht 6' (1.829 m)   Wt 98.9 kg (218 lb)   SpO2 98%   BMI 29.57 kg/m²     Physical Exam     Physical Exam:   GENERAL: NAD  HEENT:  NC/AT, PERRL, EOMI, MMM, no scleral icterus  CARDIAC:  RRR, +S1/S2, no S3/S4 heard, no m/g/r  PULMONARY:  CTA B/L, no  wheezing/rales/rhonci, non-labored breathing  ABDOMEN:  Soft, NT/ND, +BS, no rebound/guarding/rigidity  Extremities:  2+ Pulses in DP/PT. No edema, cyanosis, or clubbing  NEUROLOGIC:  Alert/oriented x3. No motor or sensory deficits  SKIN:  No rashes or erythema        Lab Results: I personally reviewed relevant lab results. CBC/BMP: No new results in last 24 hours. , Creatinine Clearance: CrCl cannot be calculated (Patient's most recent lab result is older than the maximum 7 days allowed.)., LFTs: No new results in last 24 hours. , PTT/INR:No new results in last 24 hours.     Radiology Results Review: I have reviewed the following images/report studies in PACS: No results found.

## 2025-03-27 NOTE — PROGRESS NOTES
Name: Silas Lr      : 1965      MRN: [id number]  Encounter Provider: Radha Kent MD  Encounter Date: 3/27/2025   Encounter department: [facility] [place]  :  Assessment & Plan  Gastroesophageal reflux disease without esophagitis  See plan below  Orders:    pantoprazole (PROTONIX) 40 mg tablet; Take 1 tablet (40 mg total) by mouth 2 (two) times a day    Ace's esophagus without dysplasia  Previous EGD with Ace's esophagus roughly 5 years ago.  Not on PPI.  Does have significant chest pain sometimes with eating.  Rare intermittent heartburn  Start Protonix 40 mg twice daily after EGD  Plan for EGD for further evaluation  GERD lifestyle changes discussed, including avoidance of trigger foods (potential foods include coffee, caffeine, chocolate, mint, tomato-based products, spicy foods, fatty foods), avoid tight fitting clothing, elevated head of bed 30 degrees, avoid eating 2-3 hours prior to bedtime, weight loss, avoid alcohol, avoid tobacco use.       Chest pain, unspecified type  See plan above       History of colon polyps  Last colonoscopy  with multiple colon polyps, sigmoid diverticulosis and internal hemorrhoids.  Plan for repeat colonoscopy       Odynophagia  Reports history of some pain with swallowing.  Will plan for EGD as above.       Fatty liver  Mild and noted on MR enterography in .  No significant alcohol use.  BMI 29.6.  No obvious signs or symptoms of cirrhosis on labs or imaging  Counseled on diet, exercise and weight loss (recommend 5 to 10%)  Avoid alcohol  Consider elastography             History of Present Illness   Silas Lr is a 59 y.o. male who presents for GERD, chest pain, Ace's esophagus and history of colon polyps.    He states he has been having chest pain and saw cardiology and no etiology discovered likely related to heartburn.  Pain is worse with eating but sometimes can occur without  eating.    No dysphagia    Has odynophagia          Last EGD and colonoscopy 2019 as above  HPI  History obtained from: patient  Review of Systems A complete review of systems is negative other than that noted above in the HPI.    Medical History Reviewed by provider this encounter:     .  Past Medical History   History reviewed. No pertinent past medical history.  History reviewed. No pertinent surgical history.  History reviewed. No pertinent family history.   reports that he has never smoked. He has never used smokeless tobacco. He reports that he does not currently use alcohol. He reports that he does not use drugs.  Current Outpatient Medications   Medication Instructions    allopurinol (ZYLOPRIM) 100 mg, Daily    Diclofenac Sodium (VOLTAREN) 2 g, Topical, 4 times daily    pantoprazole (PROTONIX) 40 mg, Oral, 2 times daily    predniSONE 10 mg tablet TAKE 4 TABLETS DAILY FOR 3 DAYS, 3 TABS X 2 DAYS, 2 TABS X 2 DAYS, 1 TAB X 2 DAYS (START 9/6/24)     Allergies   Allergen Reactions    Penicillins Angioedema      Current Outpatient Medications on File Prior to Visit   Medication Sig Dispense Refill    allopurinol (ZYLOPRIM) 100 mg tablet Take 100 mg by mouth daily      Diclofenac Sodium (VOLTAREN) 1 % Apply 2 g topically 4 (four) times a day 100 g 0    predniSONE 10 mg tablet TAKE 4 TABLETS DAILY FOR 3 DAYS, 3 TABS X 2 DAYS, 2 TABS X 2 DAYS, 1 TAB X 2 DAYS (START 9/6/24) (Patient not taking: Reported on 3/27/2025)       No current facility-administered medications on file prior to visit.         Objective   /84 (BP Location: Left arm, Patient Position: Sitting, Cuff Size: Standard)   Pulse 67   Temp 97.6 °F (36.4 °C) (Tympanic)   Resp 18   Ht 6' (1.829 m)   Wt 98.9 kg (218 lb)   SpO2 98%   BMI 29.57 kg/m²     Physical Exam     Physical Exam:   GENERAL: NAD  HEENT:  NC/AT, PERRL, EOMI, MMM, no scleral icterus  CARDIAC:  RRR, +S1/S2, no S3/S4 heard, no m/g/r  PULMONARY:  CTA B/L, no  wheezing/rales/rhonci, non-labored breathing  ABDOMEN:  Soft, NT/ND, +BS, no rebound/guarding/rigidity  Extremities:  2+ Pulses in DP/PT. No edema, cyanosis, or clubbing  NEUROLOGIC:  Alert/oriented x3. No motor or sensory deficits  SKIN:  No rashes or erythema        Lab Results: I personally reviewed relevant lab results. CBC/BMP: No new results in last 24 hours. , Creatinine Clearance: CrCl cannot be calculated (Patient's most recent lab result is older than the maximum 7 days allowed.)., LFTs: No new results in last 24 hours. , PTT/INR:No new results in last 24 hours.     Radiology Results Review: I have reviewed the following images/report studies in PACS: No results found.

## 2025-03-27 NOTE — PATIENT INSTRUCTIONS
Scheduled date of EGD/colonoscopy (as of today):4/4/25  Physician performing EGD/colonoscopy:Yoli  Location of EGD/colonoscopy:[place]  Desired bowel prep reviewed with patient:Carissa/Miralax  Instructions reviewed with patient by:Gabriele kaur  Clearances:   none

## 2025-03-28 ENCOUNTER — TELEPHONE (OUTPATIENT)
Dept: GASTROENTEROLOGY | Facility: CLINIC | Age: 60
End: 2025-03-28

## 2025-03-28 NOTE — TELEPHONE ENCOUNTER
----- Message from Radha Kent MD sent at 3/27/2025  6:58 PM EDT -----  Regarding: RE: testing question  Sorry I just saw this message. He doesn't need CXR. We should evaluate esophagus first which is more likely source. I will speak to him when I see him for his procedure. Thanks.    -Radha  ----- Message -----  From: Deyanira Hall  Sent: 3/27/2025   2:35 PM EDT  To: Radha Kent MD  Subject: testing question                                 Hi Silas Gavin had another question for you,  he wondered because of the pain in the middle of his chest should he have an xray or imaging to ??    Thanks  Gabriele

## 2025-03-28 NOTE — TELEPHONE ENCOUNTER
Called and spoke to Silas relayed Dr esparza's message  he stated no problem he will talk him day of procedure

## 2025-04-04 ENCOUNTER — ANESTHESIA EVENT (OUTPATIENT)
Dept: GASTROENTEROLOGY | Facility: HOSPITAL | Age: 60
End: 2025-04-04
Payer: MEDICARE

## 2025-04-04 ENCOUNTER — HOSPITAL ENCOUNTER (OUTPATIENT)
Dept: GASTROENTEROLOGY | Facility: HOSPITAL | Age: 60
Setting detail: OUTPATIENT SURGERY
End: 2025-04-04
Attending: INTERNAL MEDICINE
Payer: MEDICARE

## 2025-04-04 ENCOUNTER — ANESTHESIA (OUTPATIENT)
Dept: GASTROENTEROLOGY | Facility: HOSPITAL | Age: 60
End: 2025-04-04
Payer: MEDICARE

## 2025-04-04 VITALS
OXYGEN SATURATION: 98 % | WEIGHT: 221.34 LBS | SYSTOLIC BLOOD PRESSURE: 112 MMHG | DIASTOLIC BLOOD PRESSURE: 61 MMHG | BODY MASS INDEX: 29.98 KG/M2 | RESPIRATION RATE: 17 BRPM | HEART RATE: 49 BPM | TEMPERATURE: 97.9 F | HEIGHT: 72 IN

## 2025-04-04 DIAGNOSIS — R07.9 CHEST PAIN, UNSPECIFIED TYPE: ICD-10-CM

## 2025-04-04 DIAGNOSIS — K22.70 BARRETT'S ESOPHAGUS WITHOUT DYSPLASIA: ICD-10-CM

## 2025-04-04 DIAGNOSIS — K21.9 GASTROESOPHAGEAL REFLUX DISEASE, UNSPECIFIED WHETHER ESOPHAGITIS PRESENT: ICD-10-CM

## 2025-04-04 PROCEDURE — 43239 EGD BIOPSY SINGLE/MULTIPLE: CPT | Performed by: INTERNAL MEDICINE

## 2025-04-04 PROCEDURE — 88313 SPECIAL STAINS GROUP 2: CPT | Performed by: PATHOLOGY

## 2025-04-04 PROCEDURE — 45385 COLONOSCOPY W/LESION REMOVAL: CPT | Performed by: INTERNAL MEDICINE

## 2025-04-04 PROCEDURE — 88342 IMHCHEM/IMCYTCHM 1ST ANTB: CPT | Performed by: PATHOLOGY

## 2025-04-04 PROCEDURE — 88305 TISSUE EXAM BY PATHOLOGIST: CPT | Performed by: PATHOLOGY

## 2025-04-04 PROCEDURE — 88341 IMHCHEM/IMCYTCHM EA ADD ANTB: CPT | Performed by: PATHOLOGY

## 2025-04-04 RX ORDER — PROPOFOL 10 MG/ML
INJECTION, EMULSION INTRAVENOUS AS NEEDED
Status: DISCONTINUED | OUTPATIENT
Start: 2025-04-04 | End: 2025-04-04

## 2025-04-04 RX ORDER — SODIUM CHLORIDE, SODIUM LACTATE, POTASSIUM CHLORIDE, CALCIUM CHLORIDE 600; 310; 30; 20 MG/100ML; MG/100ML; MG/100ML; MG/100ML
125 INJECTION, SOLUTION INTRAVENOUS CONTINUOUS
Status: DISCONTINUED | OUTPATIENT
Start: 2025-04-04 | End: 2025-04-08 | Stop reason: HOSPADM

## 2025-04-04 RX ORDER — LIDOCAINE HYDROCHLORIDE 20 MG/ML
INJECTION, SOLUTION EPIDURAL; INFILTRATION; INTRACAUDAL; PERINEURAL AS NEEDED
Status: DISCONTINUED | OUTPATIENT
Start: 2025-04-04 | End: 2025-04-04

## 2025-04-04 RX ADMIN — PROPOFOL 50 MG: 10 INJECTION, EMULSION INTRAVENOUS at 12:38

## 2025-04-04 RX ADMIN — LIDOCAINE HYDROCHLORIDE 100 MG: 20 INJECTION, SOLUTION EPIDURAL; INFILTRATION; INTRACAUDAL; PERINEURAL at 12:32

## 2025-04-04 RX ADMIN — PROPOFOL 50 MG: 10 INJECTION, EMULSION INTRAVENOUS at 12:43

## 2025-04-04 RX ADMIN — SODIUM CHLORIDE, SODIUM LACTATE, POTASSIUM CHLORIDE, AND CALCIUM CHLORIDE: .6; .31; .03; .02 INJECTION, SOLUTION INTRAVENOUS at 12:29

## 2025-04-04 RX ADMIN — PROPOFOL 150 MG: 10 INJECTION, EMULSION INTRAVENOUS at 12:32

## 2025-04-04 RX ADMIN — PROPOFOL 50 MG: 10 INJECTION, EMULSION INTRAVENOUS at 12:34

## 2025-04-04 NOTE — ANESTHESIA POSTPROCEDURE EVALUATION
Post-Op Assessment Note    CV Status:  Stable  Pain Score: 0    Pain management: adequate       Mental Status:  Arousable   Hydration Status:  Euvolemic   PONV Controlled:  Controlled   Airway Patency:  Patent     Post Op Vitals Reviewed: Yes    No anethesia notable event occurred.    Staff: CRNA         Last Filed PACU Vitals:  Vitals Value Taken Time   Temp 97.9 °F (36.6 °C) 04/04/25 1253   Pulse 80 04/04/25 1253   /58 04/04/25 1253   Resp 16 04/04/25 1253   SpO2 97 % 04/04/25 1253

## 2025-04-04 NOTE — ANESTHESIA PREPROCEDURE EVALUATION
"\"Silas Lr is a 59 y.o. male who presents for GERD, chest pain, Ace's esophagus and history of colon polyps.     He states he has been having chest pain and saw cardiology and no etiology discovered likely related to heartburn.  Pain is worse with eating but sometimes can occur without eating.     No dysphagia     Has odynophagia\"    Procedure:  EGD  COLONOSCOPY    Relevant Problems   MUSCULOSKELETAL   (+) Acute idiopathic gout of right knee        Physical Exam    Airway    Mallampati score: II  TM Distance: >3 FB  Neck ROM: full     Dental       Cardiovascular  Rhythm: regular, No weak pulses    Pulmonary   No stridor    Other Findings        Anesthesia Plan  ASA Score- 2     Anesthesia Type- IV sedation with anesthesia with ASA Monitors.         Additional Monitors:     Airway Plan:            Plan Factors-    Chart reviewed.   Existing labs reviewed. Patient summary reviewed.                  Induction- intravenous.    Postoperative Plan-         Informed Consent- Anesthetic plan and risks discussed with patient.  I personally reviewed this patient with the CRNA. Discussed and agreed on the Anesthesia Plan with the CRNA..      NPO Status:  No vitals data found for the desired time range.        "

## 2025-04-04 NOTE — INTERVAL H&P NOTE
H&P reviewed. After examining the patient I find no changes in the patients condition since the H&P had been written.    Vitals:    04/04/25 1114   BP: 141/86   Pulse: 59   Resp: 16   Temp: 97.8 °F (36.6 °C)   SpO2: 97%

## 2025-04-09 ENCOUNTER — RESULTS FOLLOW-UP (OUTPATIENT)
Dept: GASTROENTEROLOGY | Facility: CLINIC | Age: 60
End: 2025-04-09

## 2025-04-09 PROCEDURE — 88313 SPECIAL STAINS GROUP 2: CPT | Performed by: PATHOLOGY

## 2025-04-09 PROCEDURE — 88342 IMHCHEM/IMCYTCHM 1ST ANTB: CPT | Performed by: PATHOLOGY

## 2025-04-09 PROCEDURE — 88341 IMHCHEM/IMCYTCHM EA ADD ANTB: CPT | Performed by: PATHOLOGY

## 2025-04-09 PROCEDURE — 88305 TISSUE EXAM BY PATHOLOGIST: CPT | Performed by: PATHOLOGY

## 2025-04-28 ENCOUNTER — HOSPITAL ENCOUNTER (OUTPATIENT)
Dept: CT IMAGING | Facility: HOSPITAL | Age: 60
Discharge: HOME | End: 2025-04-28
Attending: FAMILY MEDICINE
Payer: MEDICARE

## 2025-04-28 DIAGNOSIS — R13.10 DYSPHAGIA, UNSPECIFIED TYPE: ICD-10-CM

## 2025-04-28 PROCEDURE — 71250 CT THORAX DX C-: CPT
